# Patient Record
Sex: FEMALE | Race: AMERICAN INDIAN OR ALASKA NATIVE
[De-identification: names, ages, dates, MRNs, and addresses within clinical notes are randomized per-mention and may not be internally consistent; named-entity substitution may affect disease eponyms.]

---

## 2019-06-19 ENCOUNTER — HOSPITAL ENCOUNTER (EMERGENCY)
Dept: HOSPITAL 5 - ED | Age: 19
Discharge: HOME | End: 2019-06-19
Payer: SELF-PAY

## 2019-06-19 DIAGNOSIS — N76.0: Primary | ICD-10-CM

## 2019-06-19 DIAGNOSIS — B96.89: ICD-10-CM

## 2019-06-19 DIAGNOSIS — Z76.0: ICD-10-CM

## 2019-06-19 PROCEDURE — 99281 EMR DPT VST MAYX REQ PHY/QHP: CPT

## 2019-06-19 NOTE — EMERGENCY DEPARTMENT REPORT
ED Recheck HPI





- General


Chief Complaint: Urogenital-Female


Stated Complaint: BV PROMLEMS


Time Seen by Provider: 06/19/19 10:58


Source: patient


Mode of arrival: Ambulatory


Limitations: No Limitations





- History of Present Illness


Initial Comments: 





Patient is a 19-year-old female who comes to the ER for a refill of her Flagyl. 

She states that she gets often.  She is not concerned about STDs.  Her last 

menstrual cycle was last week.  She states that she just cannot get in with her 

OB/GYN.  Denies fever or abdominal pain.  She states that she has no nausea 

vomiting diarrhea.  She has no abdominal pain or back pain.


MD Complaint: medication refill request, other





- Related Data


                                  Previous Rx's











 Medication  Instructions  Recorded  Last Taken  Type


 


metroNIDAZOLE [Flagyl] 500 mg PO Q12HR #20 tab 06/19/19 Unknown Rx














ED Review of Systems


ROS: 


Stated complaint: BV PROMLEMS


Other details as noted in HPI





Comment: All other systems reviewed and negative





ED Past Medical Hx





- Past Medical History


Previous Medical History?: No





- Surgical History


Past Surgical History?: No





- Family History


Family history: no significant





- Medications


Home Medications: 


                                Home Medications











 Medication  Instructions  Recorded  Confirmed  Last Taken  Type


 


metroNIDAZOLE [Flagyl] 500 mg PO Q12HR #20 tab 06/19/19  Unknown Rx














ED Physical Exam





- General


Limitations: No Limitations





- Other


Other exam information: 





WDWN patient in NAD


VS per RN flow sheet


Alert and oriented to person, place and time. 


S1-S2.  No S3 or S4.  No systolic or diastolic murmur.  No JVD.  No pitting 

edema.


Lungs clear to auscultation bilaterally anteriorly and posteriorly.


Abdomen soft nontender bowel sounds x4


Moves all extremities well.


Mood and affect appropriate. 








ED Recheck MDM





- Differential Diagnosis


Prescription Refill(s)





- Medical Decision Making





SEE HPI





VSS


NAD 


NO FEVER


NO ABD PAIN 


LMP LAST WEEK


AMBULATORY


TAKING PO


NON TOXIC








Critical care attestation.: 


If time is entered above; I have spent that time in minutes in the direct care 

of this critically ill patient, excluding procedure time.








ED Disposition


Clinical Impression: 


 Medication refill, Bacterial vaginosis





Disposition: DC-01 TO HOME OR SELFCARE


Is pt being admited?: No


Does the pt Need Aspirin: No


Condition: Stable


Instructions:  Bacterial Vaginosis (ED)


Additional Instructions: 


DIET AS TOLERATED





MEDS AS ORDERED TODAY IN ER


FOLLOW INSTRUCTIONS ON THE BOTTLE





FOLLOW UP PCP WITHIN 48 HOURS TO ENSURE YOU ARE GETTING BETTER





ACTIVITY AS TOLERATED





MOTRIN OR TYLENOL FOR PAIN OR FEVER





RETURN TO THE ER FOR WORSENING SYMPTOMS NOT RELIEVED BY YOUR MEDICATIONS.











Prescriptions: 


metroNIDAZOLE [Flagyl] 500 mg PO Q12HR #20 tab


Referrals: 


ANTHONY CRISOSTOMO MD [Staff Physician] - 3-5 Days


Time of Disposition: 11:00

## 2019-07-09 ENCOUNTER — HOSPITAL ENCOUNTER (EMERGENCY)
Dept: HOSPITAL 5 - ED | Age: 19
LOS: 1 days | Discharge: HOME | End: 2019-07-10
Payer: COMMERCIAL

## 2019-07-09 DIAGNOSIS — B37.3: ICD-10-CM

## 2019-07-09 DIAGNOSIS — B96.89: ICD-10-CM

## 2019-07-09 DIAGNOSIS — N76.0: Primary | ICD-10-CM

## 2019-07-09 DIAGNOSIS — A64: ICD-10-CM

## 2019-07-09 PROCEDURE — 81025 URINE PREGNANCY TEST: CPT

## 2019-07-09 PROCEDURE — 81001 URINALYSIS AUTO W/SCOPE: CPT

## 2019-07-09 PROCEDURE — 87591 N.GONORRHOEAE DNA AMP PROB: CPT

## 2019-07-09 PROCEDURE — 99284 EMERGENCY DEPT VISIT MOD MDM: CPT

## 2019-07-09 PROCEDURE — 87210 SMEAR WET MOUNT SALINE/INK: CPT

## 2019-07-09 PROCEDURE — 96372 THER/PROPH/DIAG INJ SC/IM: CPT

## 2019-07-09 NOTE — EVENT NOTE
ED Screening Note


Date of service: 07/09/19


Time: 22:04


ED Screening Note: 





19 y o female presents to ED cc of vaginal irritaion, discharge and swelling x 1

week


states she also had recent unprotected intrercourse some days ago which made sx 

worse.





This initial assessment/diagnostic orders/clinical plan/treatment(s) is/are 

subject to change based on patients health status, clinical progression and re-

assessment by fellow clinical providers in the ED. Further treatment and workup 

at subsequent clinical providers discretion. Patient/guardian urged not to elope

from the ED as their condition may be serious if not clinically assessed and 

managed. 





Initial orders include: 


ua,upt, wet prep


g/c

## 2019-07-10 VITALS — DIASTOLIC BLOOD PRESSURE: 69 MMHG | SYSTOLIC BLOOD PRESSURE: 111 MMHG

## 2019-07-10 LAB
BILIRUB UR QL STRIP: (no result)
BLOOD UR QL VISUAL: (no result)
MUCOUS THREADS #/AREA URNS HPF: (no result) /HPF
PH UR STRIP: 6 [PH] (ref 5–7)
RBC #/AREA URNS HPF: 13 /HPF (ref 0–6)
UROBILINOGEN UR-MCNC: < 2 MG/DL (ref ?–2)
WBC #/AREA URNS HPF: 8 /HPF (ref 0–6)

## 2019-07-10 NOTE — EMERGENCY DEPARTMENT REPORT
ED Female  HPI





- General


Chief complaint: Urogenital-Female


Stated complaint: IRRITATED VAGINAL


Time Seen by Provider: 19 22:03


Source: patient


Mode of arrival: Ambulatory


Limitations: No Limitations





- History of Present Illness


Initial comments: 





Patient is a nulliparous 19-year-old -American female with no past 

medical history presents to the ED with complaint of acute onset persistent 

vaginal irritation and pain with dyspareunia and yellow vaginal discharge with 

fishy smell for the last 4 days after having unprotected sexual intercourse.  

Patient denies dysuria, urinary frequency and urgency, vaginal bleeding, nausea,

vomiting, pelvic pain, fever, chills, diarrhea, no back pain or hematuria.


MD Complaint: vaginal discharge, other (vaginal irritation and pain)


-: Sudden, days(s) (4)


Location: other (vagina)


Radiation: non-radiating


Severity: moderate


Severity scale (0 -10): 6


Quality: sharp, burning, aching


Consistency: constant


Improves with: none


Worsens with: intercourse, movement


Are you Pregnant Now?: No


Last Menstrual Period: 19


EDC: 20


Associated Symptoms: denies other symptoms, vaginal discharge.  denies: vaginal 

bleeding, abdominal pain, nausea/vomiting, fever/chills, headaches, loss of 

appetite, dysuria, hematuria, rash, seizure, shortness of breath, syncope





- Related Data


Sexually active: Yes


: 0


Para: 0


A: 0


                                  Previous Rx's











 Medication  Instructions  Recorded  Last Taken  Type


 


Fluconazole [Diflucan TAB] 150 mg PO ONCE #2 tablet 07/10/19 Unknown Rx


 


metroNIDAZOLE [Flagyl TAB] 500 mg PO Q12HR #20 tab 07/10/19 Unknown Rx











                                    Allergies











Allergy/AdvReac Type Severity Reaction Status Date / Time


 


No Known Allergies Allergy   Verified 19 21:01














ED Review of Systems


ROS: 


Stated complaint: IRRITATED VAGINAL


Other details as noted in HPI





Constitutional: denies: chills, fever


Eyes: denies: eye pain, eye discharge, vision change


ENT: denies: ear pain, throat pain


Respiratory: denies: cough, shortness of breath, wheezing


Cardiovascular: denies: chest pain, palpitations


Endocrine: no symptoms reported


Gastrointestinal: denies: abdominal pain, nausea, diarrhea


Genitourinary: discharge, dyspareunia, other (vaginal irritation).  denies: 

urgency, dysuria


Musculoskeletal: denies: back pain, joint swelling, arthralgia


Skin: denies: rash, lesions


Neurological: denies: headache, weakness, paresthesias


Psychiatric: denies: anxiety, depression


Hematological/Lymphatic: denies: easy bleeding, easy bruising





ED Past Medical Hx





- Past Medical History


Previous Medical History?: No





- Surgical History


Past Surgical History?: No





- Social History


Smoking Status: Never Smoker


Substance Use Type: None





- Medications


Home Medications: 


                                Home Medications











 Medication  Instructions  Recorded  Confirmed  Last Taken  Type


 


Fluconazole [Diflucan TAB] 150 mg PO ONCE #2 tablet 07/10/19  Unknown Rx


 


metroNIDAZOLE [Flagyl TAB] 500 mg PO Q12HR #20 tab 07/10/19  Unknown Rx














ED Physical Exam





- General


Limitations: No Limitations


General appearance: alert, in no apparent distress





- Head


Head exam: Present: atraumatic, normocephalic, normal inspection





- Eye


Eye exam: Present: normal appearance, PERRL, EOMI


Pupils: Present: normal accommodation





- ENT


ENT exam: Present: normal exam, normal orophraynx, mucous membranes moist, TM's 

normal bilaterally, normal external ear exam





- Neck


Neck exam: Present: normal inspection





- Respiratory


Respiratory exam: Present: normal lung sounds bilaterally.  Absent: respiratory 

distress, wheezes, rales, rhonchi, chest wall tenderness, accessory muscle use, 

decreased breath sounds, prolonged expiratory





- Cardiovascular


Cardiovascular Exam: Present: regular rate, normal rhythm, normal heart sounds. 

Absent: systolic murmur, diastolic murmur, rubs, gallop





- GI/Abdominal


GI/Abdominal exam: Present: soft, normal bowel sounds.  Absent: tenderness, 

guarding, rebound, hyperactive bowel sounds, hypoactive bowel sounds, 

organomegaly





- Rectal


Rectal exam: Present: deferred





- 


Speculum exam: Present: vaginal discharge, cervical discharge.  Absent: vaginal 

bleeding, foreign body


Bi-manual exam: Present: normal bi-manual exam, other (Female RN present during 

pelvic exam).  Absent: cervical motion tendernes, adnexal tenderness, uterine 

enlargement, uterine tenderness





- Extremities Exam


Extremities exam: Present: normal inspection, full ROM, normal capillary refill





- Back Exam


Back exam: Present: normal inspection, full ROM.  Absent: CVA tenderness (L), 

muscle spasm, paraspinal tenderness





- Neurological Exam


Neurological exam: Present: alert, oriented X3, CN II-XII intact, normal gait, 

reflexes normal





- Psychiatric


Psychiatric exam: Present: normal affect, normal mood





- Skin


Skin exam: Present: warm, dry, intact, normal color.  Absent: rash





ED Course





                                   Vital Signs











  19





  22:07


 


Temperature 98.5 F


 


Pulse Rate 70


 


Respiratory 18





Rate 


 


Blood Pressure 119/71


 


O2 Sat by Pulse 99





Oximetry 














- Reevaluation(s)


Reevaluation #1: 





07/10/19 01:35


Patient is alert and oriented 3 and is not in distress.  Urinalysis is 

unremarkable.  Exam is remarkable for thick yellowish vaginal discharge with 

malodorous fishy smell.  The cervix is irritated.  No cervical motion tenderness

or adnexal tenderness on pelvic exam.  Patient empirically treated in the ED for

STD given the exposure, and he sent home on medications for bacterial vaginosis 

and candidal vaginitis.  Patient advised to follow-up with her primary care 

physician or OB/GYN physician in 7-10 days for reevaluation.  Patient advised to

return to the ED immediately if symptoms get worse.





ED Medical Decision Making





- Medical Decision Making





Patient is alert and oriented 3 and is not in distress.  Urinalysis is 

unremarkable.  Exam is remarkable for thick yellowish vaginal discharge with 

malodorous fishy smell.  The cervix is irritated.  No cervical motion tenderness

or adnexal tenderness on pelvic exam.  Patient empirically treated in the ED for

STD given the exposure, and he sent home on medications for bacterial vaginosis 

and candidal vaginitis.  Patient advised to follow-up with her primary care 

physician or OB/GYN physician in 7-10 days for reevaluation.  Patient advised to

return to the ED immediately if symptoms get worse.





- Differential Diagnosis


Bacterial vaginosis; Candida vaginitis; Trichomonas vaginitis, STD


Critical care attestation.: 


If time is entered above; I have spent that time in minutes in the direct care 

of this critically ill patient, excluding procedure time.








ED Disposition


Clinical Impression: 


 Vaginitis due to Candida, Bacterial vaginosis, STD (sexually transmitted 

disease)





Disposition: - TO HOME OR SELFCARE


Is pt being admited?: No


Does the pt Need Aspirin: No


Condition: Stable


Instructions:  Bacterial Vaginosis (ED), Vaginitis (ED)


Additional Instructions: 


Take medications and food, drink plenty of fluids and follow-up with your 

primary-care physician in 7-10 days for reevaluation.  Return to the ED 

immediately if symptoms get worse.


Prescriptions: 


Fluconazole [Diflucan TAB] 150 mg PO ONCE #2 tablet


metroNIDAZOLE [Flagyl TAB] 500 mg PO Q12HR #20 tab


Referrals: 


[facility] [Outside] - 3-5 Days


Forms:  STI Treatment and Prevention


Time of Disposition: 01:24


Print Language: ENGLISH

## 2019-12-13 ENCOUNTER — HOSPITAL ENCOUNTER (EMERGENCY)
Dept: HOSPITAL 5 - ED | Age: 19
Discharge: LEFT BEFORE DISCHARGE | End: 2019-12-13
Payer: SELF-PAY

## 2019-12-13 VITALS — DIASTOLIC BLOOD PRESSURE: 84 MMHG | SYSTOLIC BLOOD PRESSURE: 123 MMHG

## 2019-12-13 DIAGNOSIS — N89.8: Primary | ICD-10-CM

## 2019-12-13 NOTE — EMERGENCY DEPARTMENT REPORT
Chief Complaint: Urogenital-Female


Stated Complaint: BV


Time Seen by Provider: 12/13/19 10:29





- HPI


History of Present Illness: 





19-year-old female presents to the ED stay and she thinks she has a bacterial 

infection as she has been having vaginal discharge and has had these symptoms 

before in the past.  Patient denies fevers/chills/nausea vomiting/pelvic 

pain/dysuria or any problems.





- ROS


Review of Systems: 





Is noted in HPI





- Exam


Vital Signs: 


                                   Vital Signs











  12/13/19





  09:57


 


Temperature 98.2 F


 


Pulse Rate 69


 


Respiratory 20





Rate 


 


Blood Pressure 123/84


 


O2 Sat by Pulse 100





Oximetry 











Physical Exam: 





Neuro: Alert and oriented 3, in no acute distress


MSE screening note: 


Focused history and physical exam performed.


Due to findings the following was ordered:











ED Medical Decision Making





- Medical Decision Making





I discussed with Ms Hester at this is not a medical emergency and she isn't 

should follow-up with outside Medical Center for screening and treatment.


Vital signs are normal patient is in no acute distress.








ED Disposition for MSE


Clinical Impression: 


 Vaginal discharge





Disposition: Z-07 MED SCREENING EXAM-LEFT


Is pt being admited?: No


Does the pt Need Aspirin: No


Condition: Stable


Instructions:  Vaginitis (ED)


Additional Instructions: 


Make sure to follow up with the primary care physician as discussed.


Take all your medications as you've been prescribed.


If you have any worsening symptoms or develop new symptoms please return to ED 

immediately.


Prescriptions: 


metroNIDAZOLE [Flagyl TAB] 500 mg PO Q12HR #14 tab


Referrals: 


[facility] [Outside] - 3-5 Days


[facility] [Outside] - 3-5 Days


[facility] [Outside] - 3-5 Days


Forms:  Work/School Release Form(ED)


Time of Disposition: 10:48

## 2020-03-29 ENCOUNTER — HOSPITAL ENCOUNTER (EMERGENCY)
Dept: HOSPITAL 5 - ED | Age: 20
Discharge: HOME | End: 2020-03-29
Payer: SELF-PAY

## 2020-03-29 VITALS — DIASTOLIC BLOOD PRESSURE: 92 MMHG | SYSTOLIC BLOOD PRESSURE: 146 MMHG

## 2020-03-29 DIAGNOSIS — N89.8: ICD-10-CM

## 2020-03-29 DIAGNOSIS — Z79.899: ICD-10-CM

## 2020-03-29 DIAGNOSIS — J06.9: Primary | ICD-10-CM

## 2020-03-29 PROCEDURE — 71046 X-RAY EXAM CHEST 2 VIEWS: CPT

## 2020-03-29 PROCEDURE — 99283 EMERGENCY DEPT VISIT LOW MDM: CPT

## 2020-03-29 NOTE — XRAY REPORT
CHEST 2 VIEWS 



INDICATION / CLINICAL INFORMATION:

cough, fever 1 month.



COMPARISON: 

None available.



FINDINGS:



SUPPORT DEVICES: None.



HEART / MEDIASTINUM: No significant abnormality. 



LUNGS / PLEURA: No significant pulmonary or pleural abnormality. No pneumothorax. 



ADDITIONAL FINDINGS: No significant additional findings.



IMPRESSION:

1. No acute findings.



Signer Name: Anna Zurita MD 

Signed: 3/29/2020 9:20 PM

Workstation Name: [id number]

## 2020-03-29 NOTE — EMERGENCY DEPARTMENT REPORT
HPI





- General


Chief Complaint: Upper Respiratory Infection


Time Seen by Provider: 20 20:34





- HPI


HPI: 





This is a 19-year-old female here reports that she has cough x2 weeks.  She 

denies any fever or chills.  Denies any nausea vomiting or diarrhea.  Denies any

abdominal or chest pain.  Denies any neck pain or stiffness denies any sore 

throat or headache.  She reports that she is having nasal congestion and runny 

nose.  Patient is also concerned about STD exposure last menstrual period was 

3/10/2019.  She denies any urinary burning frequency urgency.  She says she is 

having some vaginal discharge that recently started.  She says she does not have

a primary care physician.  Pain is 0/10





ED Past Medical Hx





- Past Medical History


Previous Medical History?: No


Additional medical history: Lupus





- Surgical History


Past Surgical History?: No





- Family History


Family history: hypertension





- Social History


Smoking Status: Never Smoker


Substance Use Type: None





- Medications


Home Medications: 


                                Home Medications











 Medication  Instructions  Recorded  Confirmed  Last Taken  Type


 


Fluconazole [Diflucan TAB] 150 mg PO ONCE #2 tablet 07/10/19  Unknown Rx


 


metroNIDAZOLE [Flagyl TAB] 500 mg PO Q12HR #14 tab 19  Unknown Rx


 


Cetirizine HCl [ZyrTEC] 10 mg PO QAM 14 Days #14 capsule 20  Unknown Rx


 


Fluticasone [Flonase] 1 spray NS QDAY 14 Days #1 bottle 20  Unknown Rx














ED Review of Systems


ROS: 


Stated complaint: COUGH POSSIBLE STD


Other details as noted in HPI





Constitutional: denies: chills, fever


ENT: congestion.  denies: ear pain, throat pain


Respiratory: cough.  denies: shortness of breath, SOB with exertion, SOB at 

rest, stridor, wheezing


Cardiovascular: denies: chest pain, palpitations, dyspnea on exertion, edema, 

syncope


Gastrointestinal: denies: abdominal pain, nausea, vomiting


Genitourinary: discharge.  denies: urgency, dysuria, frequency, hematuria, 

abnormal menses, dyspareunia


Musculoskeletal: denies: back pain, arthralgia, myalgia


Skin: denies: rash


Neurological: denies: headache, vertigo





Physical Exam





- Physical Exam


Vital Signs: 


                                   Vital Signs











  20





  20:14


 


Temperature 97.8 F


 


Pulse Rate 79


 


Respiratory 16





Rate 


 


Blood Pressure 146/92


 


O2 Sat by Pulse 100





Oximetry 











General: 





This is a 19-year-old female well-nourished well-developed in no acute distress.

  Patient is nontoxic


Physical Exam: 





Head: Normocephalic atraumatic


Ears:BIateral middle ear congested without erythema and loss of bony landmarks. 

 Maxwell EAC with normal exam.  No mastoid bone tenderness.


Nose: Nasal turbinates pale and boggy.  Clear drainage.  Maxillary and frontal 

sinuses nontender to palpate


Mouth: Moist, no pharyngeal erythema or exudate .  e.  UVULA midline and oral 

airways patent. No peritonsillar abscess


Neck: Nontender to palpate, supple, normal range of motion. No adenopathy. No c-

spine tenderness.  


 Eyes: Bilateral Sclerae and  conjunctiva without injection.  Bilateral pupils 

equal and reactive to light.  Bilateral lids are normal.    Normal 

accommodation.BEOMI


Lungs: Clear to auscultate bilaterally, no rhonchi wheezes or rales.  Normal 

work of breathing and no chest wall tenderness


CV: S1, S2.  Regular rate and rhythm negative murmur.  Capillary refill is less 

than 3 seconds


Abdomen: Nontender to palpation in all quadrants: No guarding or rebound 

tenderness.  Positive bowel sounds in all quadrants


Extremity: No clubbing, cyanosis or edema.  +2 pulses in all extremities and no 

neurovascular compromise


Skin: Clean dry and intact, no rashes or lesions


Psych: Normal mood and behavior





ED Course


                                   Vital Signs











  20





  20:14


 


Temperature 97.8 F


 


Pulse Rate 79


 


Respiratory 16





Rate 


 


Blood Pressure 146/92


 


O2 Sat by Pulse 100





Oximetry 














- Reevaluation(s)


Reevaluation #1: 





20 21:46


Patient remained stable throughout ED course.





ED Medical Decision Making





- Radiology Data


Radiology results: report reviewed


Chest x-ray PA and lateral dictated by radiologist and report reviewed by 

myself.  Please see details below


Findings





[facility] 


[address] [place] [address] 





XRay Report 


Signed 





 Patient: NORTH CHACON MR#: [id number] 


 [id number] 


 : 2000 Acct:[id number] 





 Age/Sex: 19 / F ADM Date: 20 





 Loc: ED 


 Attending Dr: 








 Ordering Physician: JARAD PEREYRA 


 Date of Service: 20 


 Procedure(s): XR chest routine 2V 


 Accession Number(s): [id number] 





 cc: JARAD PEREYRA Time In Minutes: 





 CHEST 2 VIEWS 





INDICATION / CLINICAL INFORMATION: 


cough, fever 1 month. 





COMPARISON: 


None available. 





FINDINGS: 





SUPPORT DEVICES: None. 





HEART / MEDIASTINUM: No significant abnormality. 





LUNGS / PLEURA: No significant pulmonary or pleural abnormality. No 

pneumothorax. 





ADDITIONAL FINDINGS: No significant additional findings. 





IMPRESSION: 


1. No acute findings. 





Signer Name: Anna Zurita MD 


Signed: 3/29/2020 9:20 PM 


Workstation Name: [id number] 








 Transcribed By: HRC 


 Dictated By: Anna Zurita MD 


 Electronically Authenticated By: Anna Zurita MD 


 Signed Date/Time: 20 











 DD/DT: 20 


 TD/TT: 





- Medical Decision Making





This is a 19-year-old female here report that she has coughing x2 weeks with 

nasal drainage and congestion.  Patient physical findings for upper respiratory 

otherwise stable.  Chest x-ray dictated by radiologist and report reviewed 

myself and no acute findings.  Patient with URI with cough and congestion 

without any fever.  Vital signs are stable she is afebrile.  Patient discharged 

home in stable condition with prescription for Zyrtec, Tessalon Perles and 

Flonase and to follow-up with primary care physician in 2 to 3 days.  She voiced

 understanding of discharge diagnosis, x-ray report and discharged home in 

stable condition.





- Differential Diagnosis


PNA, bronchitis, sinusitis, rhinitis, URI with cough and congestion


Critical care attestation.: 


If time is entered above; I have spent that time in minutes in the direct care o

f this critically ill patient, excluding procedure time.








ED Disposition


Clinical Impression: 


 URI with cough and congestion





Disposition: DC-01 TO HOME OR SELFCARE


Is pt being admited?: No


Does the pt Need Aspirin: No


Condition: Stable


Instructions:  Upper Respiratory Infection (ED), Acute Cough (ED)


Additional Instructions: 


Please follow-up with primary care physician in 2 to 3 days as discussed.  Ryan dove refer to discharge instruction for clinic name and address with phone number


Take medication as prescribed.


If your condition worsen return to the emergency room otherwise follow-up with 

primary care


Please follow-up with [facility] regarding concerns for 

STD.  You can also follow-up at [facility] clinic as instructed 

for management of this.


Referrals: 


Follow-up, [facility] [Other] - 2-3 Days


[facility] [Outside] - 2-3 Days


Forms:  Work/School Release Form(ED)

## 2021-02-11 ENCOUNTER — HOSPITAL ENCOUNTER (INPATIENT)
Dept: HOSPITAL 5 - ED | Age: 21
LOS: 8 days | Discharge: HOME | DRG: 674 | End: 2021-02-19
Attending: INTERNAL MEDICINE | Admitting: HOSPITALIST
Payer: COMMERCIAL

## 2021-02-11 DIAGNOSIS — I12.0: ICD-10-CM

## 2021-02-11 DIAGNOSIS — R30.0: ICD-10-CM

## 2021-02-11 DIAGNOSIS — Z20.822: ICD-10-CM

## 2021-02-11 DIAGNOSIS — R65.10: ICD-10-CM

## 2021-02-11 DIAGNOSIS — Z79.899: ICD-10-CM

## 2021-02-11 DIAGNOSIS — N18.6: ICD-10-CM

## 2021-02-11 DIAGNOSIS — N17.9: Primary | ICD-10-CM

## 2021-02-11 DIAGNOSIS — M32.14: ICD-10-CM

## 2021-02-11 DIAGNOSIS — D72.829: ICD-10-CM

## 2021-02-11 LAB
BASOPHILS # (AUTO): 0.1 K/MM3 (ref 0–0.1)
BASOPHILS NFR BLD AUTO: 0.4 % (ref 0–1.8)
BILIRUB UR QL STRIP: (no result)
BLOOD UR QL VISUAL: (no result)
BUN SERPL-MCNC: 57 MG/DL (ref 7–17)
BUN SERPL-MCNC: 58 MG/DL (ref 7–17)
BUN/CREAT SERPL: 9 %
BUN/CREAT SERPL: 9 %
CALCIUM SERPL-MCNC: 7.7 MG/DL (ref 8.4–10.2)
CALCIUM SERPL-MCNC: 7.9 MG/DL (ref 8.4–10.2)
EOSINOPHIL # BLD AUTO: 0.2 K/MM3 (ref 0–0.4)
EOSINOPHIL NFR BLD AUTO: 1.9 % (ref 0–4.3)
HCT VFR BLD CALC: 30.4 % (ref 30.3–42.9)
HEMOLYSIS INDEX: 22
HEMOLYSIS INDEX: 23
HGB BLD-MCNC: 9.9 GM/DL (ref 10.1–14.3)
HYALINE CASTS #/AREA URNS LPF: 1 /LPF
LYMPHOCYTES # BLD AUTO: 1.1 K/MM3 (ref 1.2–5.4)
LYMPHOCYTES NFR BLD AUTO: 8.5 % (ref 13.4–35)
MCHC RBC AUTO-ENTMCNC: 32 % (ref 30–34)
MCV RBC AUTO: 82 FL (ref 79–97)
MONOCYTES # (AUTO): 1.2 K/MM3 (ref 0–0.8)
MONOCYTES % (AUTO): 9 % (ref 0–7.3)
PH UR STRIP: 7 [PH] (ref 5–7)
PLATELET # BLD: 120 K/MM3 (ref 140–440)
PROT UR STRIP-MCNC: >500 MG/DL
RBC # BLD AUTO: 3.69 M/MM3 (ref 3.65–5.03)
RBC #/AREA URNS HPF: 13 /HPF (ref 0–6)
UROBILINOGEN UR-MCNC: < 2 MG/DL (ref ?–2)
WBC #/AREA URNS HPF: 4 /HPF (ref 0–6)

## 2021-02-11 PROCEDURE — 86038 ANTINUCLEAR ANTIBODIES: CPT

## 2021-02-11 PROCEDURE — 77001 FLUOROGUIDE FOR VEIN DEVICE: CPT

## 2021-02-11 PROCEDURE — 82728 ASSAY OF FERRITIN: CPT

## 2021-02-11 PROCEDURE — 81001 URINALYSIS AUTO W/SCOPE: CPT

## 2021-02-11 PROCEDURE — 89050 BODY FLUID CELL COUNT: CPT

## 2021-02-11 PROCEDURE — 85610 PROTHROMBIN TIME: CPT

## 2021-02-11 PROCEDURE — 84300 ASSAY OF URINE SODIUM: CPT

## 2021-02-11 PROCEDURE — 87040 BLOOD CULTURE FOR BACTERIA: CPT

## 2021-02-11 PROCEDURE — 84156 ASSAY OF PROTEIN URINE: CPT

## 2021-02-11 PROCEDURE — 85025 COMPLETE CBC W/AUTO DIFF WBC: CPT

## 2021-02-11 PROCEDURE — 86706 HEP B SURFACE ANTIBODY: CPT

## 2021-02-11 PROCEDURE — 80048 BASIC METABOLIC PNL TOTAL CA: CPT

## 2021-02-11 PROCEDURE — 86021 WBC ANTIBODY IDENTIFICATION: CPT

## 2021-02-11 PROCEDURE — 96374 THER/PROPH/DIAG INJ IV PUSH: CPT

## 2021-02-11 PROCEDURE — 36558 INSERT TUNNELED CV CATH: CPT

## 2021-02-11 PROCEDURE — 81025 URINE PREGNANCY TEST: CPT

## 2021-02-11 PROCEDURE — 83970 ASSAY OF PARATHORMONE: CPT

## 2021-02-11 PROCEDURE — 76770 US EXAM ABDO BACK WALL COMP: CPT

## 2021-02-11 PROCEDURE — 87806 HIV AG W/HIV1&2 ANTB W/OPTIC: CPT

## 2021-02-11 PROCEDURE — 71046 X-RAY EXAM CHEST 2 VIEWS: CPT

## 2021-02-11 PROCEDURE — 96365 THER/PROPH/DIAG IV INF INIT: CPT

## 2021-02-11 PROCEDURE — 84550 ASSAY OF BLOOD/URIC ACID: CPT

## 2021-02-11 PROCEDURE — 93005 ELECTROCARDIOGRAM TRACING: CPT

## 2021-02-11 PROCEDURE — 84100 ASSAY OF PHOSPHORUS: CPT

## 2021-02-11 PROCEDURE — 74176 CT ABD & PELVIS W/O CONTRAST: CPT

## 2021-02-11 PROCEDURE — 77012 CT SCAN FOR NEEDLE BIOPSY: CPT

## 2021-02-11 PROCEDURE — 83520 IMMUNOASSAY QUANT NOS NONAB: CPT

## 2021-02-11 PROCEDURE — 82570 ASSAY OF URINE CREATININE: CPT

## 2021-02-11 PROCEDURE — 87086 URINE CULTURE/COLONY COUNT: CPT

## 2021-02-11 PROCEDURE — 85007 BL SMEAR W/DIFF WBC COUNT: CPT

## 2021-02-11 PROCEDURE — C1750 CATH, HEMODIALYSIS,LONG-TERM: HCPCS

## 2021-02-11 PROCEDURE — 86160 COMPLEMENT ANTIGEN: CPT

## 2021-02-11 PROCEDURE — 82550 ASSAY OF CK (CPK): CPT

## 2021-02-11 PROCEDURE — 36415 COLL VENOUS BLD VENIPUNCTURE: CPT

## 2021-02-11 PROCEDURE — 83690 ASSAY OF LIPASE: CPT

## 2021-02-11 PROCEDURE — 76937 US GUIDE VASCULAR ACCESS: CPT

## 2021-02-11 PROCEDURE — U0003 INFECTIOUS AGENT DETECTION BY NUCLEIC ACID (DNA OR RNA); SEVERE ACUTE RESPIRATORY SYNDROME CORONAVIRUS 2 (SARS-COV-2) (CORONAVIRUS DISEASE [COVID-19]), AMPLIFIED PROBE TECHNIQUE, MAKING USE OF HIGH THROUGHPUT TECHNOLOGIES AS DESCRIBED BY CMS-2020-01-R: HCPCS

## 2021-02-11 PROCEDURE — 83550 IRON BINDING TEST: CPT

## 2021-02-11 PROCEDURE — 83615 LACTATE (LD) (LDH) ENZYME: CPT

## 2021-02-11 PROCEDURE — 96375 TX/PRO/DX INJ NEW DRUG ADDON: CPT

## 2021-02-11 PROCEDURE — 86803 HEPATITIS C AB TEST: CPT

## 2021-02-11 PROCEDURE — 84484 ASSAY OF TROPONIN QUANT: CPT

## 2021-02-11 PROCEDURE — 93306 TTE W/DOPPLER COMPLETE: CPT

## 2021-02-11 PROCEDURE — 85730 THROMBOPLASTIN TIME PARTIAL: CPT

## 2021-02-11 NOTE — EMERGENCY DEPARTMENT REPORT
ED General Adult HPI





- General


Stated complaint: CHEST/BACK/HEAD PAIN/BURNING URINATION





- History of Present Illness


Initial comments: 


Patient 20-year-old -American female who presents for cough chest wall 

pain with cough bilateral flank pain And generalized malaise for 1 week.  

Patient states subjective fever no fever noted in triage tonight.  Patient 

denies suspicious contacts travel or known Covid contacts.  Patient states 

intermittent nausea.  There is no wheezing, stridor, shortness of breath.  

Symptoms are exacerbated by activity.  Symptoms are relieved by nothing tried.  

Last menstrual cycle 1 month ago.  V





- Related Data


                                  Previous Rx's











 Medication  Instructions  Recorded  Last Taken  Type


 


Fluconazole (Nf) [Diflucan TAB] 150 mg PO ONCE #2 tablet 07/10/19 Unknown Rx


 


metroNIDAZOLE [Flagyl TAB] 500 mg PO Q12HR #14 tab 12/13/19 Unknown Rx


 


Cetirizine HCl [ZyrTEC] 10 mg PO QAM 14 Days #14 capsule 03/29/20 Unknown Rx


 


Fluticasone [Flonase] 1 spray NS QDAY 14 Days #1 bottle 03/29/20 Unknown Rx











                                    Allergies











Allergy/AdvReac Type Severity Reaction Status Date / Time


 


No Known Allergies Allergy   Verified 03/29/20 20:14














ED Review of Systems


ROS: 


Stated complaint: CHEST/BACK/HEAD PAIN/BURNING URINATION


Other details as noted in HPI





Constitutional: malaise.  denies: chills, fever


Eyes: denies: eye pain, eye discharge, vision change


ENT: congestion


Respiratory: cough


Cardiovascular: chest pain


Endocrine: no symptoms reported


Gastrointestinal: denies: abdominal pain, nausea, diarrhea


Genitourinary: denies: urgency, dysuria, discharge


Musculoskeletal: back pain.  denies: joint swelling, arthralgia


Skin: denies: rash, lesions


Neurological: denies: headache, weakness, paresthesias, vertigo


Psychiatric: denies: anxiety, depression


Hematological/Lymphatic: denies: easy bleeding, easy bruising





ED Past Medical Hx





- Past Medical History


Additional medical history: Lupus





- Social History


Smoking Status: Never Smoker


Substance Use Type: None





- Medications


Home Medications: 


                                Home Medications











 Medication  Instructions  Recorded  Confirmed  Last Taken  Type


 


Fluconazole (Nf) [Diflucan TAB] 150 mg PO ONCE #2 tablet 07/10/19  Unknown Rx


 


metroNIDAZOLE [Flagyl TAB] 500 mg PO Q12HR #14 tab 12/13/19  Unknown Rx


 


Cetirizine HCl [ZyrTEC] 10 mg PO QAM 14 Days #14 capsule 03/29/20  Unknown Rx


 


Fluticasone [Flonase] 1 spray NS QDAY 14 Days #1 bottle 03/29/20  Unknown Rx














ED Physical Exam





- General


General appearance: alert, in no apparent distress





- Head


Head exam: Present: atraumatic, normocephalic





- Eye


Eye exam: Present: normal appearance, EOMI


Pupils: Present: normal accommodation





- ENT


ENT exam: Present: mucous membranes moist





- Neck


Neck exam: Present: normal inspection





- Respiratory


Respiratory exam: Present: normal lung sounds bilaterally, chest wall tenderness

 (bilat anterior chest wall pain to palpation ).  Absent: respiratory distress, 

wheezes, stridor





- Cardiovascular


Cardiovascular Exam: Present: regular rate, normal rhythm, normal heart sounds. 

 Absent: systolic murmur, diastolic murmur, rubs, gallop





- GI/Abdominal


GI/Abdominal exam: Present: soft, normal bowel sounds.  Absent: distended, 

tenderness, guarding, rebound, rigid, bruit, hernia





- Rectal


Rectal exam: Present: deferred





- Extremities Exam


Extremities exam: Present: normal inspection, full ROM.  Absent: tenderness





- Back Exam


Back exam: Present: full ROM, tenderness, CVA tenderness (R), CVA tenderness (L)





- Neurological Exam


Neurological exam: Present: alert, oriented X3, CN II-XII intact, normal gait, 

reflexes normal





- Psychiatric


Psychiatric exam: Present: normal affect, normal mood





- Skin


Skin exam: Present: warm, dry, intact, normal color.  Absent: rash


Critical care attestation.: 


If time is entered above; I have spent that time in minutes in the direct care 

of this critically ill patient, excluding procedure time.








ED Disposition


Condition: Stable

## 2021-02-11 NOTE — EMERGENCY DEPARTMENT REPORT
ED Chest Pain HPI





- General


Chief Complaint: Chest Pain


Stated Complaint: CHEST/BACK/HEAD PAIN/BURNING URINATION


PUI?: No


Time Seen by Provider: 02/11/21 21:56


Source: patient


Mode of arrival: Ambulatory


Limitations: No Limitations





- History of Present Illness


Initial Comments: 





Patient is a 20-year-old female that presents emergency room with multiple 

complaints.  Patient states that she woke up this morning at 7 AM with bilateral

chest pain, back pain, headache.  Patient states the symptoms are worsening.    







Patient states that her chest pain is worse with palpation and movement. Patient

dates the chest pain is a 10 out of 10.  Patient states that her chest pain is 

better with rest.  Patient is chest pain is in her bilateral chest.





Patient states that her back pain is a 10 out of 10.  Patient states that the 

back pain is better with rest and worse with movement.  Patient states that it 

is her lower back and flank area that she is hurting.





Patient states her headache is mild. Patient states her headache is a 3 out of 

10.  Patient states her headache is better with rest and worse with movement and

exertion.  Patient denies blurry vision.  Patient denies neck stiffness.  

Patient denies neck pain.  Patient denies fever and chills.





Patient also complains of dysuria.  Patient states that dysuria is been going on

for 2 days.  Patient states that it burns when she urinates.  Patient denies 

blood in her urine.





Patient states she is also got a vaginal discharge.  Patient states her vaginal 

discharge is white and has been going on for 2 weeks.  Patient states she was 

seen at a local family practice and was given treatment but did not have a 

pelvic exam.








Patient denies recent travel.  Patient denies recent international travel.  

Patient denies exposure to the novel coronavirus.  Patient denies sick contacts.

 Patient denies fever and chills.  Patient denies cough.  Patient denies 

diarrhea.  Patient denies coming in contact with anybody with symptoms of the 

novel coronavirus.





Nurse David in the room during the entire history and physical.








MD Complaint: chest pain


-: Sudden


Onset: during rest


Pain Location: left chest, right chest


Pain Radiation: none


Severity: severe


Severity scale (0 -10): 10


Consistency: constant


Improves With: rest


Worsens With: palpation, movement


re: nausea.  denies: vomting, diaphoresis, dyspnea, sense of impending doom


Other Symptoms: denies: cough, fever, syncope, rash, acid taste in mouth, leg 

swelling, palpitations, burping


Treatments Prior to Arrival: none


Aspirin use within the Past 7 Days: (0) No





- Related Data


On Oral Contraceptives: No


                                  Previous Rx's











 Medication  Instructions  Recorded  Last Taken  Type


 


Fluconazole (Nf) [Diflucan TAB] 150 mg PO ONCE #2 tablet 07/10/19 Unknown Rx


 


metroNIDAZOLE [Flagyl TAB] 500 mg PO Q12HR #14 tab 12/13/19 Unknown Rx


 


Cetirizine HCl [ZyrTEC] 10 mg PO QAM 14 Days #14 capsule 03/29/20 Unknown Rx


 


Fluticasone [Flonase] 1 spray NS QDAY 14 Days #1 bottle 03/29/20 Unknown Rx











                                    Allergies











Allergy/AdvReac Type Severity Reaction Status Date / Time


 


No Known Allergies Allergy   Verified 03/29/20 20:14














Heart Score





- HEART Score


History: Slightly suspicious


EKG: Normal


Age: < 45


Risk factors: No known risk factors


Troponin: < normal limit


HEART Score: 0





ED Review of Systems


ROS: 


Stated complaint: CHEST/BACK/HEAD PAIN/BURNING URINATION


Other details as noted in HPI





Constitutional: denies: chills, fever


Eyes: denies: eye pain, eye discharge, vision change


ENT: denies: ear pain, throat pain


Respiratory: denies: cough, shortness of breath, wheezing


Cardiovascular: chest pain.  denies: palpitations


Endocrine: no symptoms reported


Gastrointestinal: as per HPI, nausea.  denies: vomiting, diarrhea


Genitourinary: as per HPI, dysuria, discharge.  denies: urgency


Musculoskeletal: as per HPI, back pain.  denies: joint swelling, arthralgia


Skin: denies: rash, lesions


Neurological: as per HPI, headache.  denies: weakness, paresthesias


Psychiatric: denies: anxiety, depression


Hematological/Lymphatic: denies: easy bleeding, easy bruising





ED Past Medical Hx





- Past Medical History


Previous Medical History?: Yes


Additional medical history: Lupus





- Surgical History


Past Surgical History?: No





- Family History


Family history: no significant





- Social History


Smoking Status: Never Smoker


Substance Use Type: None





- Medications


Home Medications: 


                                Home Medications











 Medication  Instructions  Recorded  Confirmed  Last Taken  Type


 


Fluconazole (Nf) [Diflucan TAB] 150 mg PO ONCE #2 tablet 07/10/19  Unknown Rx


 


metroNIDAZOLE [Flagyl TAB] 500 mg PO Q12HR #14 tab 12/13/19  Unknown Rx


 


Cetirizine HCl [ZyrTEC] 10 mg PO QAM 14 Days #14 capsule 03/29/20  Unknown Rx


 


Fluticasone [Flonase] 1 spray NS QDAY 14 Days #1 bottle 03/29/20  Unknown Rx














ED Physical Exam





- General


Limitations: No Limitations


General appearance: alert, in no apparent distress





- Head


Head exam: Present: atraumatic, normocephalic





- Eye


Eye exam: Present: normal appearance





- ENT


ENT exam: Present: mucous membranes moist





- Neck


Neck exam: Present: normal inspection





- Respiratory


Respiratory exam: Present: normal lung sounds bilaterally, chest wall tenderness

 (Reproduces symptoms with bilateral chest wall palpation.  Tenderness to 

palpation to the bilateral chest wall.).  Absent: respiratory distress, wheezes,

 rales





- Cardiovascular


Cardiovascular Exam: Present: regular rate, normal rhythm.  Absent: systolic 

murmur, diastolic murmur, rubs, gallop





- GI/Abdominal


GI/Abdominal exam: Present: soft, tenderness (Epigastric tenderness.  Right and 

left flank tenderness.), normal bowel sounds.  Absent: distended, guarding





- Rectal


Rectal exam: Present: deferred





- Extremities Exam


Extremities exam: Present: normal inspection





- Back Exam


Back exam: Present: normal inspection, full ROM, tenderness, CVA tenderness (R),

 CVA tenderness (L)





- Neurological Exam


Neurological exam: Present: alert, oriented X3





- Psychiatric


Psychiatric exam: Present: normal affect, normal mood





- Skin


Skin exam: Present: warm, dry, intact, normal color.  Absent: rash





ED Course


                                   Vital Signs











  02/11/21 02/11/21 02/12/21





  21:46 23:15 00:45


 


Temperature 99.0 F  


 


Pulse Rate 91 H  78


 


Respiratory 18 16 16





Rate   


 


Blood Pressure 159/110  167/109


 


O2 Sat by Pulse 100  100





Oximetry   














- Reevaluation(s)


Reevaluation #1: 


I discussed current results with patient.  Patient will have a x-ray and CT 

scan.  Patient agrees with plan of care.  Nurse Khanna in room during this entire 

interaction.


02/12/21 00:01











Reevaluation #2: 


For the patient's vaginal discharge I discussed the possibility of doing a 

pelvic exam and the patient refused.  Patient states she prefers to have a 

female provider do her pelvic exam.





I discussed all results with patient.  I discussed plan of care with patient.  

Patient agrees with plan of care and admission.  Patient to be admitted to the 

hospitalist service.





Nurse Frankie in the room the entire interaction.


02/12/21 00:51











- Consultations


Consultation #1: 


I discussed case with nephrologist, Dr. MANCIA.  DR. MANCIA recommends admission to 

the hospital service.  Nephrology states that he will place orders and see the 

patient in the morning.


02/12/21 00:34





Consultation #2: 


Hospitalist consulted for admission.  Hospitalist to admit patient.


02/12/21 00:51








SUNIL score





- Sunil Score


Age > 65: (0) No


Aspirin use within the Past 7 Days: (0) No


3 or more CAD Risk Factors: (0) No


2 or more Angina events in past 24 hrs: (0) No


Known CAD with more than 50% Stenosis: (0) No


Elevated Cardiac Markers: (0) No


ST Deviation Greater than 0.5mm: (0) No


SUNIL Score: 0





ED Medical Decision Making





- Lab Data


Result diagrams: 


                                 02/11/21 21:58





                                 02/11/21 23:09





- EKG Data


-: EKG Interpreted by Me


EKG shows normal: sinus rhythm, axis, intervals, QRS complexes, ST-T waves


Rate: normal





- Radiology Data


Radiology results: report reviewed, image reviewed


interpreted by me: 





Chest x-ray: No pneumonia, no pneumothorax, no foreign body, no osseous 

findings, no acute findings














 CHEST 2 VIEWS 





INDICATION / CLINICAL INFORMATION: chest pain. 





COMPARISON: 03/29/20 





FINDINGS: 





SUPPORT DEVICES: None. 


HEART / MEDIASTINUM: No significant abnormality. 


LUNGS / PLEURA: No significant pulmonary or pleural abnormality. No pne

umothorax. 





ADDITIONAL FINDINGS: No significant additional findings. 





IMPRESSION: 


1. No acute findings. No change. 


============================================ 





CT ABDOMEN AND PELVIS WITHOUT CONTRAST 





INDICATION / CLINICAL INFORMATION: Bi-Lateral flank pain. 





TECHNIQUE: Axial CT images were obtained through the abdomen and pelvis without 

IV contrast. All 


 CT scans at this location are performed using CT dose reduction for ALARA by 

means of automated 


 exposure control. 





COMPARISON: None available. 





FINDINGS: 





LOWER CHEST: No significant abnormality. 


LIVER: No significant abnormality. 


GALLBLADDER: No significant abnormality. 


BILE DUCTS: No significant abnormality. 


PANCREAS: No significant abnormality. 


SPLEEN: No significant abnormality. 


ADRENALS: No significant abnormality. 


RIGHT KIDNEY / URETER: No significant abnormality. 


LEFT KIDNEY / URETER: No significant abnormality. 





STOMACH / SMALL BOWEL: No significant abnormality. 


COLON: No acute abnormality. Moderate amount of fecal material throughout the 

colon. 


APPENDIX: No significant abnormality. 


PERITONEUM: No free fluid. No free air. No fluid collection. 


LYMPH NODES: No significant adenopathy. 


AORTA / ARTERIES: No significant abnormality. 


IVC / VEINS: No significant abnormality. 





URINARY BLADDER: No significant abnormality. 


REPRODUCTIVE ORGANS: No significant abnormality. 





ADDITIONAL FINDINGS: None. 





SKELETAL SYSTEM: No significant abnormality. 





IMPRESSION: 


1. No acute findings in the abdomen or pelvis. No urinary tract stones or 

hydronephrosis. 


============================================ 








- Medical Decision Making





Patient is a 20-year-old female that presents emergency room with complaints of 

chest pain, leg pain, back pain, headache, dysuria.  Patient had labs done.  

Patient's labs are significant for anemia and acute renal failure.  The 

chemistry was rechecked to verify validity.  Patient was given saline bolus 

after the acute renal failure was verified.  Patient's only past medical history

 is lupus but patient has never had any lupus flareups or problems from her 

lupus.  Patient had a CT scan of the abdomen for flank pain.  Patient CT scan of

 the abdomen was negative for acute findings.  Patient had a chest x-ray was 

negative for acute finding.  Nephrology consulted while in the ER.  Patient 

admitted to the hospitalist service.  Of evaluation and treatment.  





Patient refused pelvic exam for vaginal discharge.











- Differential Diagnosis


Chest pain, back pain, kidney stone, flank pain, UTI


Critical Care Time: Yes


Critical care time in (mins) excluding proc time.: 35


Critical care attestation.: 


If time is entered above; I have spent that time in minutes in the direct care 

of this critically ill patient, excluding procedure time.





Critical Care Time: 





35 MINUTES











ED Disposition


Clinical Impression: 


 Flank pain, acute, Chest wall pain, Nephrotic syndrome, Dysuria





Back pain


Qualifiers:


 Back pain location: low back pain Chronicity: acute Back pain laterality: 

bilateral Sciatica presence: without sciatica Qualified Code(s): M54.5 - Low 

back pain





Chest pain


Qualifiers:


 Chest pain type: unspecified Qualified Code(s): R07.9 - Chest pain, unspecified





Headache


Qualifiers:


 Headache type: unspecified Headache chronicity pattern: acute headache Intract

ability: not intractable Qualified Code(s): R51.9 - Headache, unspecified





Acute kidney failure


Qualifiers:


 Acute renal failure type: unspecified Qualified Code(s): N17.9 - Acute kidney f

ailure, unspecified





Disposition: [id number] OP ADMIT IP TO THIS [facility]


Is pt being admited?: Yes


Does the pt Need Aspirin: No


Condition: Critical


Time of Disposition: 00:53

## 2021-02-12 LAB
BAND NEUTROPHILS # (MANUAL): 0 K/MM3
BASOPHILS # (AUTO): 0 K/MM3 (ref 0–0.1)
BASOPHILS NFR BLD AUTO: 0.2 % (ref 0–1.8)
BILIRUB UR QL STRIP: (no result)
BLOOD UR QL VISUAL: (no result)
BUN SERPL-MCNC: 51 MG/DL (ref 7–17)
BUN/CREAT SERPL: 8 %
CALCIUM SERPL-MCNC: 7.6 MG/DL (ref 8.4–10.2)
CREATININE,URINE: 45.6 MG/DL (ref 0.1–20)
CREATININE,URINE: 46 MG/DL (ref 0.1–20)
EOSINOPHIL # BLD AUTO: 0 K/MM3 (ref 0–0.4)
EOSINOPHIL NFR BLD AUTO: 0.2 % (ref 0–4.3)
HCT VFR BLD CALC: 30 % (ref 30.3–42.9)
HEMOLYSIS INDEX: 38
HGB BLD-MCNC: 9.7 GM/DL (ref 10.1–14.3)
LYMPHOCYTES # BLD AUTO: 0.5 K/MM3 (ref 1.2–5.4)
LYMPHOCYTES NFR BLD AUTO: 3.1 % (ref 13.4–35)
MCHC RBC AUTO-ENTMCNC: 32 % (ref 30–34)
MCV RBC AUTO: 82 FL (ref 79–97)
MONOCYTES # (AUTO): 1.1 K/MM3 (ref 0–0.8)
MONOCYTES % (AUTO): 6.8 % (ref 0–7.3)
MUCOUS THREADS #/AREA URNS HPF: (no result) /HPF
MYELOCYTES # (MANUAL): 0 K/MM3
PH UR STRIP: 7 [PH] (ref 5–7)
PLATELET # BLD: 86 K/MM3 (ref 140–440)
PROMYELOCYTES # (MANUAL): 0 K/MM3
PROT UR STRIP-MCNC: >500 MG/DL
PROTEIN/CREATININE RATIO,URINE: 5.04
RBC # BLD AUTO: 3.66 M/MM3 (ref 3.65–5.03)
RBC #/AREA URNS HPF: 15 /HPF (ref 0–6)
TOTAL CELLS COUNTED BLD: 100
UROBILINOGEN UR-MCNC: < 2 MG/DL (ref ?–2)
WBC #/AREA URNS HPF: 4 /HPF (ref 0–6)

## 2021-02-12 RX ADMIN — HYDRALAZINE HYDROCHLORIDE PRN MG: 20 INJECTION INTRAMUSCULAR; INTRAVENOUS at 05:43

## 2021-02-12 RX ADMIN — ACETAMINOPHEN PRN MG: 325 TABLET ORAL at 21:29

## 2021-02-12 RX ADMIN — MORPHINE SULFATE PRN MG: 2 INJECTION, SOLUTION INTRAMUSCULAR; INTRAVENOUS at 06:57

## 2021-02-12 RX ADMIN — Medication SCH ML: at 21:30

## 2021-02-12 RX ADMIN — HYDRALAZINE HYDROCHLORIDE PRN MG: 20 INJECTION INTRAMUSCULAR; INTRAVENOUS at 18:01

## 2021-02-12 RX ADMIN — HEPARIN SODIUM SCH UNIT: 5000 INJECTION, SOLUTION INTRAVENOUS; SUBCUTANEOUS at 21:29

## 2021-02-12 RX ADMIN — FLUTICASONE PROPIONATE SCH MCG: 50 SPRAY, METERED NASAL at 18:50

## 2021-02-12 RX ADMIN — CEFTRIAXONE SODIUM SCH MLS/HR: 1 INJECTION, POWDER, FOR SOLUTION INTRAMUSCULAR; INTRAVENOUS at 02:11

## 2021-02-12 RX ADMIN — CALCIUM SCH MG: 500 TABLET ORAL at 21:29

## 2021-02-12 RX ADMIN — MORPHINE SULFATE PRN MG: 2 INJECTION, SOLUTION INTRAMUSCULAR; INTRAVENOUS at 18:37

## 2021-02-12 RX ADMIN — Medication SCH ML: at 11:16

## 2021-02-12 RX ADMIN — ONDANSETRON PRN MG: 2 INJECTION INTRAMUSCULAR; INTRAVENOUS at 06:57

## 2021-02-12 RX ADMIN — ONDANSETRON PRN MG: 2 INJECTION INTRAMUSCULAR; INTRAVENOUS at 15:29

## 2021-02-12 RX ADMIN — FAMOTIDINE SCH MG: 10 TABLET ORAL at 21:29

## 2021-02-12 RX ADMIN — HEPARIN SODIUM SCH UNIT: 5000 INJECTION, SOLUTION INTRAVENOUS; SUBCUTANEOUS at 05:44

## 2021-02-12 RX ADMIN — CALCIUM SCH MG: 500 TABLET ORAL at 12:32

## 2021-02-12 RX ADMIN — MORPHINE SULFATE PRN MG: 2 INJECTION, SOLUTION INTRAMUSCULAR; INTRAVENOUS at 11:43

## 2021-02-12 RX ADMIN — FAMOTIDINE SCH MG: 10 TABLET ORAL at 11:15

## 2021-02-12 RX ADMIN — HEPARIN SODIUM SCH UNIT: 5000 INJECTION, SOLUTION INTRAVENOUS; SUBCUTANEOUS at 15:29

## 2021-02-12 NOTE — HISTORY AND PHYSICAL REPORT
History of Present Illness


Date of examination: 02/12/21


Chief complaint: 





Chest pain, back pain headache and dysuria


History of present illness: 





20-year-old female was brought to the emergency room because of chest pain, back

pain headache and burning sensation during micturition since 7 AM this morning


Patient complained of bilateral chest pain is a 10 out of 10.  Patient states 

that her chest pain is better with rest worse with palpation.  





Patient states that her back pain is a 10 out of 10.  Patient states that the 

back pain is better with rest and worse with movement.  Patient states that it 

is her lower back and flank area that she is hurting.





Patient states her headache is mild. Patient states her headache is a 3 out of 

10.  Patient states her headache is better with rest and worse with movement and

exertion.  Patient denies blurry vision.  Patient denies neck stiffness.  

Patient denies neck pain.  Patient denies fever and chills.





Patient also complains of dysuria.  Patient states that dysuria is been going on

for 2 days.  Patient states that it burns when she urinates.  Patient denies 

blood in her urine.





Patient states she is also got a vaginal discharge.  Patient states her vaginal 

discharge is white and has been going on for 2 weeks.  Patient states she was s

een at a local family practice and was given treatment but did not have a pelvic

exam.











Medications and Allergies


                                    Allergies











Allergy/AdvReac Type Severity Reaction Status Date / Time


 


No Known Allergies Allergy   Verified 03/29/20 20:14











                                Home Medications











 Medication  Instructions  Recorded  Confirmed  Last Taken  Type


 


Fluconazole (Nf) [Diflucan TAB] 150 mg PO ONCE #2 tablet 07/10/19  Unknown Rx


 


metroNIDAZOLE [Flagyl TAB] 500 mg PO Q12HR #14 tab 12/13/19  Unknown Rx


 


Cetirizine HCl [ZyrTEC] 10 mg PO QAM 14 Days #14 capsule 03/29/20  Unknown Rx


 


Fluticasone [Flonase] 1 spray NS QDAY 14 Days #1 bottle 03/29/20  Unknown Rx











Active Meds: 


Active Medications





Fluticasone Propionate (Fluticasone Propionate Nasal Spray 16 Gm)  50 mcg NS 

QDAY KETTY


Miscellaneous Medication (Fluconazole (Nf))  150 mg PO ONCE KETTY











Review of Systems


Cardiovascular: chest pain


Genitourinary Female: dysuria, urinary frequency, urgency


Musculoskeletal: low back pain


Neurological: headaches





Exam





- Constitutional


Vitals: 


                                        











Temp Pulse Resp BP Pulse Ox


 


 99.0 F   78   16   167/109   100 


 


 02/11/21 21:46  02/12/21 00:45  02/12/21 00:45  02/12/21 00:45  02/12/21 00:45











General appearance: Present: no acute distress, well-nourished





- EENT


Eyes: Present: PERRL


ENT: hearing intact, clear oral mucosa





- Neck


Neck: Present: supple, normal ROM





- Respiratory


Respiratory effort: normal


Respiratory: bilateral: diminished





- Cardiovascular


Heart Sounds: Present: S1 & S2.  Absent: rub, click





- Extremities


Extremities: pulses symmetrical, No edema


Peripheral Pulses: within normal limits





- Abdominal


General gastrointestinal: Present: soft, non-tender, non-distended, normal bowel

 sounds


Female genitourinary: Present: normal





- Integumentary


Integumentary: Present: clear, warm, dry





- Musculoskeletal


Musculoskeletal: gait normal, strength equal bilaterally





- Psychiatric


Psychiatric: appropriate mood/affect, intact judgment & insight





- Neurologic


Neurologic: CNII-XII intact, moves all extremities





HEART Score





- HEART Score


EKG: Normal


Age: < 45


Risk factors: No known risk factors


Troponin: < normal limit





Results





- Labs


CBC & Chem 7: 


                                 02/11/21 21:58





                                 02/11/21 23:09


Labs: 


                             Laboratory Last Values











WBC  13.0 K/mm3 (4.5-11.0)  H  02/11/21  21:58    


 


RBC  3.69 M/mm3 (3.65-5.03)   02/11/21  21:58    


 


Hgb  9.9 gm/dl (10.1-14.3)  L  02/11/21  21:58    


 


Hct  30.4 % (30.3-42.9)   02/11/21  21:58    


 


MCV  82 fl (79-97)   02/11/21  21:58    


 


MCH  27 pg (28-32)  L  02/11/21  21:58    


 


MCHC  32 % (30-34)   02/11/21  21:58    


 


RDW  14.2 % (13.2-15.2)   02/11/21  21:58    


 


Plt Count  120 K/mm3 (140-440)  L  02/11/21  21:58    


 


Lymph % (Auto)  8.5 % (13.4-35.0)  L  02/11/21  21:58    


 


Mono % (Auto)  9.0 % (0.0-7.3)  H  02/11/21  21:58    


 


Eos % (Auto)  1.9 % (0.0-4.3)   02/11/21  21:58    


 


Baso % (Auto)  0.4 % (0.0-1.8)   02/11/21  21:58    


 


Lymph # (Auto)  1.1 K/mm3 (1.2-5.4)  L  02/11/21  21:58    


 


Mono # (Auto)  1.2 K/mm3 (0.0-0.8)  H  02/11/21  21:58    


 


Eos # (Auto)  0.2 K/mm3 (0.0-0.4)   02/11/21  21:58    


 


Baso # (Auto)  0.1 K/mm3 (0.0-0.1)   02/11/21  21:58    


 


Seg Neutrophils %  80.2 % (40.0-70.0)  H  02/11/21  21:58    


 


Seg Neutrophils #  10.4 K/mm3 (1.8-7.7)  H  02/11/21  21:58    


 


Sodium  136 mmol/L (137-145)  L  02/11/21  23:09    


 


Potassium  4.6 mmol/L (3.6-5.0)   02/11/21  23:09    


 


Chloride  102.5 mmol/L ()   02/11/21  23:09    


 


Carbon Dioxide  17 mmol/L (22-30)  L  02/11/21  23:09    


 


Anion Gap  21 mmol/L  02/11/21  23:09    


 


BUN  58 mg/dL (7-17)  H  02/11/21  23:09    


 


Creatinine  6.5 mg/dL (0.6-1.2)  H  02/11/21  23:09    


 


Estimated GFR  10 ml/min  02/11/21  23:09    


 


BUN/Creatinine Ratio  9 %  02/11/21  23:09    


 


Glucose  90 mg/dL ()   02/11/21  23:09    


 


Calcium  7.9 mg/dL (8.4-10.2)  L  02/11/21  23:09    


 


Lipase  71 units/L (13-60)  H  02/11/21  23:09    


 


Urine Color  Straw  (Yellow)   02/11/21  22:00    


 


Urine Turbidity  Clear  (Clear)   02/11/21  22:00    


 


Urine pH  7.0  (5.0-7.0)   02/11/21  22:00    


 


Ur Specific Gravity  1.008  (1.003-1.030)   02/11/21  22:00    


 


Urine Protein  >500 mg/dL (Negative)   02/11/21  22:00    


 


Urine Glucose (UA)  50 mg/dL (Negative)   02/11/21  22:00    


 


Urine Ketones  Neg mg/dL (Negative)   02/11/21  22:00    


 


Urine Blood  Sm  (Negative)   02/11/21  22:00    


 


Urine Nitrite  Neg  (Negative)   02/11/21  22:00    


 


Urine Bilirubin  Neg  (Negative)   02/11/21  22:00    


 


Urine Urobilinogen  < 2.0 mg/dL (<2.0)   02/11/21  22:00    


 


Ur Leukocyte Esterase  Neg  (Negative)   02/11/21  22:00    


 


Urine WBC (Auto)  4.0 /HPF (0.0-6.0)   02/11/21  22:00    


 


Urine RBC (Auto)  13.0 /HPF (0.0-6.0)   02/11/21  22:00    


 


U Epithel Cells (Auto)  1.0 /HPF (0-13.0)   02/11/21  22:00    


 


Hyaline Casts  1 /LPF  02/11/21  22:00    


 


Urine HCG, Qual  Negative  (Negative)   02/11/21  22:00    














- Imaging and Cardiology


Chest x-ray: image reviewed


CT scan - abdomen: image reviewed





Assessment and Plan





- Patient Problems


(1) Acute kidney failure


Current Visit: Yes   Status: Acute   


Qualifiers: 


   Acute renal failure type: unspecified   Qualified Code(s): N17.9 - Acute 

kidney failure, unspecified   


Plan to address problem: 


Admit the patient to the medical floor.  Put the patient on cardiac diet.  IV 

fluid half-normal saline at the rate of 100 cc/h.  Avoid nephrotoxic drug.  We 

will consult nephrology for further evaluation and treatment.  Renal ultrasound.

  Recheck CBC BMP in the morning.  Heparin 5000 units subcu every 8 hours for 

DVT prophylaxis and Pepcid 20 mg p.o. twice daily for GI prophylaxis








(2) Back pain


Current Visit: Yes   Status: Acute   


Qualifiers: 


   Back pain location: low back pain   Chronicity: acute   Back pain laterality:

 bilateral   Sciatica presence: without sciatica   Qualified Code(s): M54.5 - 

Low back pain   





(3) Chest pain


Current Visit: Yes   Status: Acute   


Qualifiers: 


   Chest pain type: unspecified   Qualified Code(s): R07.9 - Chest pain, 

unspecified   


Plan to address problem: 


We put the patient on aspirin 81 mg p.o. daily and Lipitor 40 mg p.o. daily.  

Nitroglycerin as needed.  We will do the serial troponin.  We also order an 

echocardiogram.  Heparin 5000 units subcu every 8 hours.  Will consult 

cardiology if needed








(4) Dysuria


Current Visit: Yes   Status: Acute   


Plan to address problem: 


We seen the urine for analysis and culture.  Rocephin 1 g IV daily.  Patient is 

also on Diflucan.








(5) Headache


Current Visit: Yes   Status: Acute   


Qualifiers: 


   Headache type: unspecified   Headache chronicity pattern: acute headache   

Intractability: not intractable   Qualified Code(s): R51.9 - Headache, unspecifi

ed   


Plan to address problem: 


Tylenol 650 p.o. every 6 hours as needed.  Morphine 1 to 2 mg IV every 4 hours 

as needed.

## 2021-02-12 NOTE — ULTRASOUND REPORT
ULTRASOUND RENAL



INDICATION / CLINICAL INFORMATION:

beatriz.



COMPARISON:

CT abdomen pelvis without contrast 2/12/2021



FINDINGS:

RIGHT KIDNEY: Length = 10.0 cm.    [normal > 9 cm] 

- Parenchymal Thickness = 1.6 cm.  [normal > 1.5 cm] 

- Echogenicity: Increased with poor corticomedullary differentiation

- Hydronephrosis: None.

- Cyst or mass: No significant abnormality.  

- Stones: None seen. 



LEFT KIDNEY: Length = 8.6 cm.       [normal > 9 cm] 

- Parenchymal Thickness = 1.2 cm.  [normal > 1.5 cm] 

- Echogenicity: Increased with poor corticomedullary differentiation

- Hydronephrosis: None.

- Cyst or mass: No significant abnormality.  

- Stones: None seen. 



URINARY BLADDER: No significant abnormality.

FREE FLUID: None.



ADDITIONAL FINDINGS: None.



IMPRESSION:

 Markedly echogenic kidneys consistent with nonspecific renal parenchymal disease. No focal renal les
ion or hydronephrosis.



Signer Name: Doug Verdin Jr, MD 

Signed: 2/12/2021 9:34 AM

Workstation Name: [id number]

## 2021-02-12 NOTE — CAT SCAN REPORT
CT ABDOMEN AND PELVIS WITHOUT CONTRAST



INDICATION / CLINICAL INFORMATION: Bi-Lateral flank pain.



TECHNIQUE: Axial CT images were obtained through the abdomen and pelvis without IV contrast.  All CT 
scans at this location are performed using CT dose reduction for ALARA by means of automated exposure
 control. 



COMPARISON: None available.



FINDINGS:



LOWER CHEST: No significant abnormality.

LIVER: No significant abnormality.

GALLBLADDER: No significant abnormality.  

BILE DUCTS: No significant abnormality.

PANCREAS: No significant abnormality.

SPLEEN: No significant abnormality.

ADRENALS: No significant abnormality.

RIGHT KIDNEY / URETER: No significant abnormality.

LEFT KIDNEY / URETER: No significant abnormality.



STOMACH / SMALL BOWEL: No significant abnormality. 

COLON: No acute abnormality. Moderate amount of fecal material throughout the colon. 

APPENDIX: No significant abnormality.  

PERITONEUM: No free fluid. No free air. No fluid collection.

LYMPH NODES: No significant adenopathy.

AORTA / ARTERIES: No significant abnormality. 

IVC / VEINS: No significant abnormality.



URINARY BLADDER: No significant abnormality.

REPRODUCTIVE ORGANS: No significant abnormality.



ADDITIONAL FINDINGS: None.



SKELETAL SYSTEM: No significant abnormality.



IMPRESSION:

1. No acute findings in the abdomen or pelvis. No urinary tract stones or hydronephrosis.



Signer Name: EDIN Gonzalez MD 

Signed: 2/12/2021 12:39 AM

Workstation Name: [id number]

## 2021-02-12 NOTE — CONSULTATION
History of Present Illness





- Reason for Consult


Consult date: 02/12/21


acute renal failure





- History of Present Illness


patient with questionable history of lupus was admitted yesterday for worsening 

HA and chest pain, she was found to have elevated Cr and blood pressure and was 

admitted for further work up.


she stated she had kidney biopsy about 4 years ago but she does not know the 

findings and if she lupus nephritis, she stated at some poitn she was in the 

hospital and was evaluated by rheumatology and was told she had lupus, symtpoms 

related to lupus at that times were only joint pain, she does not follow up with

any doctor and did not do any kind f blood work for the last 2 years, she said 

she very low kidney function at some point but it improved, she thinks she was 

treated with steroids in the past but no other immunospression medication.











Past History


Past Medical History: No medical history





Medications and Allergies


                                    Allergies











Allergy/AdvReac Type Severity Reaction Status Date / Time


 


No Known Allergies Allergy   Verified 03/29/20 20:14











                                Home Medications











 Medication  Instructions  Recorded  Confirmed  Last Taken  Type


 


Fluconazole (Nf) [Diflucan TAB] 150 mg PO ONCE #2 tablet 07/10/19  Unknown Rx


 


metroNIDAZOLE [Flagyl TAB] 500 mg PO Q12HR #14 tab 12/13/19  Unknown Rx


 


Cetirizine HCl [ZyrTEC] 10 mg PO QAM 14 Days #14 capsule 03/29/20  Unknown Rx


 


Fluticasone [Flonase] 1 spray NS QDAY 14 Days #1 bottle 03/29/20  Unknown Rx











Active Meds: 


Active Medications





Acetaminophen (Acetaminophen 325 Mg Tab)  650 mg PO Q4H PRN


   PRN Reason: Pain MILD(1-3)/Fever >100.5/HA


Amlodipine Besylate (Amlodipine 10 Mg Tab)  10 mg PO QDAY KETTY


Atorvastatin Calcium (Atorvastatin 20 Mg Tab)  20 mg PO QHS KETTY


Famotidine (Famotidine 10 Mg Tab)  10 mg PO BID KETTY


Fluticasone Propionate (Fluticasone Propionate Nasal Spray 16 Gm)  50 mcg NS 

QDAY KETTY


Heparin Sodium (Porcine) (Heparin 5,000 Unit/1 Ml Vial)  5,000 unit SUB-Q Q8HR 

KETTY


   Last Admin: 02/12/21 05:44 Dose:  5,000 unit


   Documented by: 


Hydralazine HCl (Hydralazine 20 Mg/1 Ml Inj)  10 mg IV Q6HR PRN


   PRN Reason: Blood Pressure


   Last Admin: 02/12/21 05:43 Dose:  10 mg


   Documented by: 


Sodium Chloride (Nacl 0.45% 1000 Ml)  1,000 mls @ 100 mls/hr IV AS DIRECT KETTY


   Last Admin: 02/12/21 02:16 Dose:  100 mls/hr


   Documented by: 


Ceftriaxone Sodium (Rocephin/Ns 1 Gm/50 Ml)  1 gm in 50 mls @ 100 mls/hr IV Q24H

 KETTY; Protocol


   Stop: 02/16/21 02:29


   Last Admin: 02/12/21 02:11 Dose:  100 mls/hr


   Documented by: 


Morphine Sulfate (Morphine 2 Mg/1 Ml Inj)  2 mg IV Q4H PRN


   PRN Reason: Pain, Moderate (4-6)


   Last Admin: 02/12/21 06:57 Dose:  2 mg


   Documented by: 


Ondansetron HCl (Ondansetron 4 Mg/2 Ml Inj)  4 mg IV Q8H PRN


   PRN Reason: Nausea And Vomiting


   Last Admin: 02/12/21 06:57 Dose:  4 mg


   Documented by: 


Sodium Chloride (Sodium Chloride 0.9% 10 Ml Flush Syringe)  10 ml IV BID KETTY


Sodium Chloride (Sodium Chloride 0.9% 10 Ml Flush Syringe)  10 ml IV PRN PRN


   PRN Reason: LINE FLUSH











Review of Systems


All systems: negative (HA and chest pain)





Exam





- Vital Signs


Vital signs: 


                                   Vital Signs











Temp Pulse Resp BP Pulse Ox


 


 99.0 F   91 H  18   159/110   100 


 


 02/11/21 21:46  02/11/21 21:46  02/11/21 21:46  02/11/21 21:46  02/11/21 21:46














- General Appearance


General appearance: well-developed, well-nourished, appears stated age


EENT: ATNC, PERRL, mucous membranes moist


Neck: Present: neck supple


Respiratory: Clear to Ascultation


Heart: regular, S1S2


Gastrointestinal: Present: normoactive bowel sounds.  Absent: tenderness, 

distended


Integumentary: no rash, warm and dry


Neurologic: no focal deficit, no asterixis, alert and oriented x3


Musculoskeletal: Present: other (no edema in BLE)


Psychiatric: mood/affect appropriate, cooperative





Results





- Lab Results





                                 02/12/21 10:15





                                 02/12/21 10:15


                             Most recent lab results











Calcium  7.6 mg/dL (8.4-10.2)  L  02/12/21  10:15    














Assessment and Plan


(1) severe renal failure


(2) HTN


(3) Chest pain


(4) Dysuria


(5) Headache








-unknown baseline, possible advanced CKD secondary to HTN and possible LN


-she not sure if she wants to do kidney biopsy, for now will check renal US and 

serology work up and decide if kidney biopsy is necessary, if so will order for 

Monday. it is unclear if she will benefit from kidney biopsy due to h/o poor 

compliance and she is high risk for complication for immunosuppression


-she is also refusing dialysis if needed, I explained to her at this point, it 

is unclear if she had reversible kidney failure and refusing dialysis can be 

life threatening if no improvement, she understood the risk but cont to refuse 

dialysis tx


-on admission she had low temp with leukocytosis and started on abx, due to 

possible ongoing infection and unclear history for lupus, will hold on using 

pulse -steroids, urine and blood cultures ordered with COVID-19 PCR


-will switch IVF to sodium bicarb gtt and replete calcium





Raul Linares MD


[phone]

## 2021-02-12 NOTE — XRAY REPORT
CHEST 2 VIEWS 



INDICATION / CLINICAL INFORMATION: chest pain.



COMPARISON: 03/29/20



FINDINGS:



SUPPORT DEVICES: None.

HEART / MEDIASTINUM: No significant abnormality. 

LUNGS / PLEURA: No significant pulmonary or pleural abnormality. No pneumothorax. 



ADDITIONAL FINDINGS: No significant additional findings.



IMPRESSION:

1. No acute findings. No change.



Signer Name: EDIN Gonzalez MD 

Signed: 2/12/2021 12:41 AM

Workstation Name: [id number]

## 2021-02-13 LAB
APTT BLD: 50.4 SEC. (ref 24.2–36.6)
BASOPHILS # (AUTO): 0 K/MM3 (ref 0–0.1)
BASOPHILS NFR BLD AUTO: 0.2 % (ref 0–1.8)
BUN SERPL-MCNC: 46 MG/DL (ref 7–17)
BUN/CREAT SERPL: 7 %
CALCIUM SERPL-MCNC: 7.8 MG/DL (ref 8.4–10.2)
EOSINOPHIL # BLD AUTO: 0 K/MM3 (ref 0–0.4)
EOSINOPHIL NFR BLD AUTO: 0.1 % (ref 0–4.3)
HCT VFR BLD CALC: 26.5 % (ref 30.3–42.9)
HEMOLYSIS INDEX: 0
HGB BLD-MCNC: 8.6 GM/DL (ref 10.1–14.3)
INR PPP: 1.21 (ref 0.87–1.13)
IRON SERPL-MCNC: 19 UG/DL (ref 37–170)
LYMPHOCYTES # BLD AUTO: 1 K/MM3 (ref 1.2–5.4)
LYMPHOCYTES NFR BLD AUTO: 5.9 % (ref 13.4–35)
MCHC RBC AUTO-ENTMCNC: 32 % (ref 30–34)
MCV RBC AUTO: 81 FL (ref 79–97)
MONOCYTES # (AUTO): 1.7 K/MM3 (ref 0–0.8)
MONOCYTES % (AUTO): 9.8 % (ref 0–7.3)
PLATELET # BLD: 85 K/MM3 (ref 140–440)
RBC # BLD AUTO: 3.28 M/MM3 (ref 3.65–5.03)
TIBC SERPL-MCNC: 176 MCG/DL (ref 250–450)

## 2021-02-13 RX ADMIN — HEPARIN SODIUM SCH UNIT: 5000 INJECTION, SOLUTION INTRAVENOUS; SUBCUTANEOUS at 13:49

## 2021-02-13 RX ADMIN — CALCIUM SCH MG: 500 TABLET ORAL at 21:49

## 2021-02-13 RX ADMIN — HEPARIN SODIUM SCH UNIT: 5000 INJECTION, SOLUTION INTRAVENOUS; SUBCUTANEOUS at 06:10

## 2021-02-13 RX ADMIN — HYDROXYCHLOROQUINE SULFATE SCH MG: 200 TABLET ORAL at 10:38

## 2021-02-13 RX ADMIN — CEFTRIAXONE SODIUM SCH MLS/HR: 1 INJECTION, POWDER, FOR SOLUTION INTRAMUSCULAR; INTRAVENOUS at 01:38

## 2021-02-13 RX ADMIN — FLUTICASONE PROPIONATE SCH MCG: 50 SPRAY, METERED NASAL at 10:38

## 2021-02-13 RX ADMIN — HEPARIN SODIUM SCH UNIT: 5000 INJECTION, SOLUTION INTRAVENOUS; SUBCUTANEOUS at 21:49

## 2021-02-13 RX ADMIN — ACETAMINOPHEN PRN MG: 325 TABLET ORAL at 04:30

## 2021-02-13 RX ADMIN — Medication SCH: at 11:51

## 2021-02-13 RX ADMIN — HYDRALAZINE HYDROCHLORIDE PRN MG: 20 INJECTION INTRAMUSCULAR; INTRAVENOUS at 04:30

## 2021-02-13 RX ADMIN — FAMOTIDINE SCH MG: 10 TABLET ORAL at 21:50

## 2021-02-13 RX ADMIN — FAMOTIDINE SCH MG: 10 TABLET ORAL at 10:37

## 2021-02-13 RX ADMIN — CALCIUM SCH: 500 TABLET ORAL at 10:52

## 2021-02-13 RX ADMIN — Medication SCH ML: at 21:50

## 2021-02-13 RX ADMIN — PIPERACILLIN SODIUM AND TAZOBACTAM SODIUM SCH MLS/HR: 2; .25 INJECTION, POWDER, LYOPHILIZED, FOR SOLUTION INTRAVENOUS at 10:38

## 2021-02-13 RX ADMIN — PIPERACILLIN SODIUM AND TAZOBACTAM SODIUM SCH MLS/HR: 2; .25 INJECTION, POWDER, LYOPHILIZED, FOR SOLUTION INTRAVENOUS at 16:47

## 2021-02-13 NOTE — PROGRESS NOTE
Assessment and Plan


(1) severe renal failure


(2) HTN


(3) Chest pain


(4) Dysuria


(5) Headache








-unknown baseline, likely advanced CKD secondary to HTN and possible LN


-she is agreeable to a kidney biopsy, Risks explained including risk of bleeding

, her Renal US shows CKD signs and she has nephrotic range proteinuria. Will do 

Kidney biospy once infection ruled out, blood Cx show NGTN.


-Pt says she will decide about dialysis after kidney bx


-on admission she had low temp with leukocytosis and started on abx, due to 

possible ongoing infection and unclear history for lupus, holding using pulse -

steroids for now, follow Blood Cx.


-Check PTH for 2HPT from CKD


-Check C3/C4.


-On IVFs








Subjective


Date of service: 02/13/21


Interval history: 


Denies SHOB. Making urine.





Objective





- Exam


Narrative Exam: 


General appearance: well-developed, well-nourished, appears stated age


EENT: ATNC, PERRL, mucous membranes moist


Neck: Present: neck supple


Respiratory: Clear to Ascultation


Heart: regular, S1S2


Gastrointestinal: Present: normoactive bowel sounds.  Absent: tenderness, 

distended


Integumentary: no rash, warm and dry


Neurologic: no focal deficit, no asterixis, alert and oriented x3


Musculoskeletal: Present: other (no edema in BLE)


Psychiatric: mood/affect appropriate, cooperative





- Vital Signs


Vital signs: 


                               Vital Signs - 12hr











  02/13/21 02/13/21 02/13/21





  04:22 04:25 06:08


 


Temperature 101.5 F H 101.5 F H 100.1 F H


 


Pulse Rate 111 H 111 H 113 H


 


Respiratory 20 20 18





Rate   


 


Blood Pressure 151/101  125/74


 


Blood Pressure  151/101 





[Right]   


 


O2 Sat by Pulse 99 99 98





Oximetry   














  02/13/21





  10:52


 


Temperature 


 


Pulse Rate 100 H


 


Respiratory 





Rate 


 


Blood Pressure 127/84


 


Blood Pressure 





[Right] 


 


O2 Sat by Pulse 





Oximetry 














- Lab





                                 02/13/21 04:40





                                 02/13/21 04:40


                             Most recent lab results











Calcium  7.8 mg/dL (8.4-10.2)  L  02/13/21  04:40    


 


Phosphorus  3.50 mg/dL (2.5-4.5)   02/13/21  04:40    


 


Urine Creatinine  45.6 mg/dL (0.1-20.0)  H  02/12/21  15:38    


 


Urine Creatinine  46.0 mg/dL (0.1-20.0)  H  02/12/21  15:38    


 


Urine Sodium  104 mmol/L  02/12/21  15:38    


 


Urine Total Protein  230 mg/dL (5-11.8)  H  02/12/21  15:38    














Medications & Allergies





- Medications


Allergies/Adverse Reactions: 


                                    Allergies





No Known Allergies Allergy (Verified 03/29/20 20:14)


   








Home Medications: 


                                Home Medications











 Medication  Instructions  Recorded  Confirmed  Last Taken  Type


 


Fluconazole (Nf) [Diflucan TAB] 150 mg PO ONCE #2 tablet 07/10/19  Unknown Rx


 


metroNIDAZOLE [Flagyl TAB] 500 mg PO Q12HR #14 tab 12/13/19  Unknown Rx


 


Cetirizine HCl [ZyrTEC] 10 mg PO QAM 14 Days #14 capsule 03/29/20  Unknown Rx


 


Fluticasone [Flonase] 1 spray NS QDAY 14 Days #1 bottle 03/29/20  Unknown Rx











Active Medications: 














Generic Name Dose Route Start Last Admin





  Trade Name Freq  PRN Reason Stop Dose Admin


 


Acetaminophen  650 mg  02/12/21 01:07  02/13/21 04:30





  Acetaminophen 325 Mg Tab  PO   650 mg





  Q4H PRN   Administration





  Pain MILD(1-3)/Fever >100.5/HA  


 


Amlodipine Besylate  10 mg  02/12/21 12:00  02/13/21 10:52





  Amlodipine 10 Mg Tab  PO   10 mg





  QDAY KETTY   Administration


 


Atorvastatin Calcium  20 mg  02/12/21 22:00  02/12/21 21:29





  Atorvastatin 20 Mg Tab  PO   20 mg





  QHS KETTY   Administration


 


Calcium Carbonate/Glycine  1,250 mg  02/12/21 12:00  02/13/21 10:52





  Calcium Carbonate 1250 Mg Tab  PO   Not Given





  BID KETTY  


 


Famotidine  10 mg  02/12/21 10:00  02/13/21 10:37





  Famotidine 10 Mg Tab  PO   10 mg





  BID KETTY   Administration


 


Fluticasone Propionate  50 mcg  02/12/21 10:00  02/13/21 10:38





  Fluticasone Propionate Nasal Spray 16 Gm  NS   50 mcg





  QDAY KETTY   Administration


 


Heparin Sodium (Porcine)  5,000 unit  02/12/21 06:00  02/13/21 06:10





  Heparin 5,000 Unit/1 Ml Vial  SUB-Q   5,000 unit





  Q8HR KETTY   Administration


 


Hydralazine HCl  10 mg  02/12/21 05:12  02/13/21 04:30





  Hydralazine 20 Mg/1 Ml Inj  IV   10 mg





  Q6HR PRN   Administration





  Blood Pressure  


 


Hydroxychloroquine Sulfate  200 mg  02/13/21 10:00  02/13/21 10:38





  Hydroxychloroquine 200 Mg Tab  PO   200 mg





  QDAY KETTY   Administration


 


Piperacillin Sod/Tazobactam Sod  2.25 gm in 50 mls @ 100 mls/hr  02/13/21 09:00 

 02/13/21 10:38





  Zosyn/Ns 2.25 Gm/50ml  IV   100 mls/hr





  Q8H KETTY   Administration


 


Morphine Sulfate  2 mg  02/12/21 01:07  02/12/21 18:37





  Morphine 2 Mg/1 Ml Inj  IV   2 mg





  Q4H PRN   Administration





  Pain, Moderate (4-6)  


 


Ondansetron HCl  4 mg  02/12/21 01:07  02/12/21 15:29





  Ondansetron 4 Mg/2 Ml Inj  IV   4 mg





  Q8H PRN   Administration





  Nausea And Vomiting  


 


Prednisone  10 mg  02/13/21 10:00  02/13/21 10:38





  Prednisone 10 Mg Tab  PO   10 mg





  QDAY KETTY   Administration


 


Sodium Chloride  10 ml  02/12/21 10:00  02/12/21 21:30





  Sodium Chloride 0.9% 10 Ml Flush Syringe  IV   10 ml





  BID KETTY   Administration


 


Sodium Chloride  10 ml  02/12/21 01:07  02/13/21 04:30





  Sodium Chloride 0.9% 10 Ml Flush Syringe  IV   10 ml





  PRN PRN   Administration





  LINE FLUSH

## 2021-02-13 NOTE — PROGRESS NOTE
Assessment and Plan


Assessment and plan: 





(1) Acute kidney failure, lupus nephritis


Current Visit: Yes   Status: Acute   


Qualifiers: 


   Acute renal failure type: unspecified   Qualified Code(s): N17.9 - Acute 

kidney failure, unspecified   


Plan to address problem: 


Admit the patient to the medical floor.  Put the patient on cardiac diet.  IV 

fluid half-normal saline at the rate of 100 cc/h.  Avoid nephrotoxic drug.  We 

will consult nephrology for further evaluation and treatment.  Renal ultrasound.

 Recheck CBC BMP in the morning.  Heparin 5000 units subcu every 8 hours for DVT

prophylaxis and Pepcid 20 mg p.o. twice daily for GI prophylaxis








(2) Back pain


Current Visit: Yes   Status: Acute   


Qualifiers: 


   Back pain location: low back pain   Chronicity: acute   Back pain laterality:

bilateral   Sciatica presence: without sciatica   Qualified Code(s): M54.5 - Low

back pain   





(3) Chest pain


Current Visit: Yes   Status: Acute   


Qualifiers: 


   Chest pain type: unspecified   Qualified Code(s): R07.9 - Chest pain, 

unspecified   


Plan to address problem: 


We put the patient on aspirin 81 mg p.o. daily and Lipitor 40 mg p.o. daily.  

Nitroglycerin as needed.  We will do the serial troponin.  We also order an 

echocardiogram.  Heparin 5000 units subcu every 8 hours.  Will consult 

cardiology if needed








(4) sepsis


Current Visit: Yes   Status: Acute   


Plan to address problem: 


We seen the urine for analysis and culture.  Rocephin 1 g IV daily.  Patient is 

also on Diflucan.








(5) Headache


Current Visit: Yes   Status: Acute   


Qualifiers: 


   Headache type: unspecified   Headache chronicity pattern: acute headache   

Intractability: not intractable   Qualified Code(s): R51.9 - Headache, 

unspecified   


Plan to address problem: 


Tylenol 650 p.o. every 6 hours as needed.  Morphine 1 to 2 mg IV every 4 hours 

as needed.











2/13/2021


-Patient is septic evidenced by leukocytosis and fever.  Patient is on IV Zosyn.

 UA is negative


-Patient has acute renal failure versus acute on chronic renal failure, with GFR

of 10, nephrology is following.  Patient has lupus and likely due to lupus 

nephritis


-Pain is likely due to lupus, I placed the patient on steroids and 

hydroxychloroquine


-Pain control








History


Interval history: 





Patient was seen and evaluated this morning


Patient said pain is getting better





Hospitalist Physical





- Physical exam


Narrative exam: 





 Not in cardiopulmonary distress. 


 The patient appeared well nourished and normally developed.


 Vital signs as documented.


 Head exam is unremarkable.


 No scleral icterus .


 Neck is without jugular venous distension, thyromegaly, or carotid bruits. 


 Lungs are clear to auscultation.


Cardiac exam reveals regular rate and  Rhythm. 


Abdominal exam reveals normal bowel sounds, nontender, no organomegaly.


Extremities are nonedematous and both femoral and pedal pulses are normal.


CNS: Alert and oriented 3.  No focal weakness.





- Constitutional


Vitals: 


                                        











Temp Pulse Resp BP Pulse Ox


 


 100.1 F H  113 H  18   125/74   98 


 


 02/13/21 06:08  02/13/21 06:08  02/13/21 06:08  02/13/21 06:08  02/13/21 06:08











General appearance: Present: no acute distress, well-nourished





HEART Score





- HEART Score


EKG: Normal


Age: < 45


Risk factors: No known risk factors


Troponin: 


                                        











Troponin T  < 0.010 ng/mL (0.00-0.029)   02/12/21  01:08    











Troponin: < normal limit





Results





- Labs


CBC & Chem 7: 


                                 02/13/21 04:40





                                 02/13/21 04:40


Labs: 


                             Laboratory Last Values











WBC  17.4 K/mm3 (4.5-11.0)  H  02/13/21  04:40    


 


RBC  3.28 M/mm3 (3.65-5.03)  L  02/13/21  04:40    


 


Hgb  8.6 gm/dl (10.1-14.3)  L  02/13/21  04:40    


 


Hct  26.5 % (30.3-42.9)  L  02/13/21  04:40    


 


MCV  81 fl (79-97)   02/13/21  04:40    


 


MCH  26 pg (28-32)  L  02/13/21  04:40    


 


MCHC  32 % (30-34)   02/13/21  04:40    


 


RDW  14.1 % (13.2-15.2)   02/13/21  04:40    


 


Plt Count  85 K/mm3 (140-440)  L  02/13/21  04:40    


 


Lymph % (Auto)  5.9 % (13.4-35.0)  L  02/13/21  04:40    


 


Mono % (Auto)  9.8 % (0.0-7.3)  H  02/13/21  04:40    


 


Eos % (Auto)  0.1 % (0.0-4.3)   02/13/21  04:40    


 


Baso % (Auto)  0.2 % (0.0-1.8)   02/13/21  04:40    


 


Lymph # (Auto)  1.0 K/mm3 (1.2-5.4)  L  02/13/21  04:40    


 


Mono # (Auto)  1.7 K/mm3 (0.0-0.8)  H  02/13/21  04:40    


 


Eos # (Auto)  0.0 K/mm3 (0.0-0.4)   02/13/21  04:40    


 


Baso # (Auto)  0.0 K/mm3 (0.0-0.1)   02/13/21  04:40    


 


Add Manual Diff  Complete   02/12/21  10:15    


 


Total Counted  100   02/12/21  10:15    


 


Seg Neutrophils %  84.0 % (40.0-70.0)  H  02/13/21  04:40    


 


Seg Neuts % (Manual)  97.0 % (40.0-70.0)  H  02/12/21  10:15    


 


Lymphocytes % (Manual)  2.0 % (13.4-35.0)  L  02/12/21  10:15    


 


Monocytes % (Manual)  1.0 % (0.0-7.3)   02/12/21  10:15    


 


Nucleated RBC %  Not Reportable   02/12/21  10:15    


 


Seg Neutrophils #  14.6 K/mm3 (1.8-7.7)  H  02/13/21  04:40    


 


Seg Neutrophils # Man  16.3 K/mm3 (1.8-7.7)  H  02/12/21  10:15    


 


Band Neutrophils #  0.0 K/mm3  02/12/21  10:15    


 


Lymphocytes # (Manual)  0.3 K/mm3 (1.2-5.4)  L  02/12/21  10:15    


 


Abs React Lymphs (Man)  0.0 K/mm3  02/12/21  10:15    


 


Monocytes # (Manual)  0.2 K/mm3 (0.0-0.8)   02/12/21  10:15    


 


Eosinophils # (Manual)  0.0 K/mm3 (0.0-0.4)   02/12/21  10:15    


 


Basophils # (Manual)  0.0 K/mm3 (0.0-0.1)   02/12/21  10:15    


 


Metamyelocytes #  0.0 K/mm3  02/12/21  10:15    


 


Myelocytes #  0.0 K/mm3  02/12/21  10:15    


 


Promyelocytes #  0.0 K/mm3  02/12/21  10:15    


 


Blast Cells #  0.0 K/mm3  02/12/21  10:15    


 


WBC Morphology  Not Reportable   02/12/21  10:15    


 


Hypersegmented Neuts  Not Reportable   02/12/21  10:15    


 


Hyposegmented Neuts  Not Reportable   02/12/21  10:15    


 


Hypogranular Neuts  Not Reportable   02/12/21  10:15    


 


Smudge Cells  Not Reportable   02/12/21  10:15    


 


Toxic Granulation  Not Reportable   02/12/21  10:15    


 


Toxic Vacuolation  Not Reportable   02/12/21  10:15    


 


Dohle Bodies  Not Reportable   02/12/21  10:15    


 


Pelger-Huet Anomaly  Not Reportable   02/12/21  10:15    


 


Wendy Rods  Not Reportable   02/12/21  10:15    


 


Platelet Estimate  Consistent w auto   02/12/21  10:15    


 


Clumped Platelets  Not Reportable   02/12/21  10:15    


 


Plt Clumps, EDTA  Not Reportable   02/12/21  10:15    


 


Large Platelets  Not Reportable   02/12/21  10:15    


 


Giant Platelets  Rare   02/12/21  10:15    


 


Platelet Satelliting  Not Reportable   02/12/21  10:15    


 


Plt Morphology Comment  Not Reportable   02/12/21  10:15    


 


RBC Morphology  Normal   02/12/21  10:15    


 


Dimorphic RBCs  Not Reportable   02/12/21  10:15    


 


Polychromasia  Not Reportable   02/12/21  10:15    


 


Hypochromasia  Not Reportable   02/12/21  10:15    


 


Poikilocytosis  Not Reportable   02/12/21  10:15    


 


Anisocytosis  Not Reportable   02/12/21  10:15    


 


Microcytosis  Not Reportable   02/12/21  10:15    


 


Macrocytosis  Not Reportable   02/12/21  10:15    


 


Spherocytes  Not Reportable   02/12/21  10:15    


 


Pappenheimer Bodies  Not Reportable   02/12/21  10:15    


 


Sickle Cells  Not Reportable   02/12/21  10:15    


 


Target Cells  Not Reportable   02/12/21  10:15    


 


Tear Drop Cells  Not Reportable   02/12/21  10:15    


 


Ovalocytes  Not Reportable   02/12/21  10:15    


 


Helmet Cells  Not Reportable   02/12/21  10:15    


 


[place] Bodies  Not Reportable   02/12/21  10:15    


 


Cabot Rings  Not Reportable   02/12/21  10:15    


 


[place] Cells  Not Reportable   02/12/21  10:15    


 


Bite Cells  Not Reportable   02/12/21  10:15    


 


Crenated Cell  Not Reportable   02/12/21  10:15    


 


Elliptocytes  Not Reportable   02/12/21  10:15    


 


Acanthocytes (Spur)  Not Reportable   02/12/21  10:15    


 


Rouleaux  Not Reportable   02/12/21  10:15    


 


Hemoglobin C Crystals  Not Reportable   02/12/21  10:15    


 


Schistocytes  Rare   02/12/21  10:15    


 


Malaria parasites  Not Reportable   02/12/21  10:15    


 


Kurtis Bodies  Not Reportable   02/12/21  10:15    


 


Hem Pathologist Commnt  No   02/12/21  10:15    


 


Sodium  133 mmol/L (137-145)  L  02/13/21  04:40    


 


Potassium  3.8 mmol/L (3.6-5.0)   02/13/21  04:40    


 


Chloride  99.4 mmol/L ()   02/13/21  04:40    


 


Carbon Dioxide  23 mmol/L (22-30)  D 02/13/21  04:40    


 


Anion Gap  14 mmol/L  02/13/21  04:40    


 


BUN  46 mg/dL (7-17)  H  02/13/21  04:40    


 


Creatinine  6.5 mg/dL (0.6-1.2)  H  02/13/21  04:40    


 


Estimated GFR  10 ml/min  02/13/21  04:40    


 


BUN/Creatinine Ratio  7 %  02/13/21  04:40    


 


Glucose  94 mg/dL ()   02/13/21  04:40    


 


Uric Acid  7.1 mg/dL (3.5-7.6)   02/12/21  14:10    


 


Calcium  7.8 mg/dL (8.4-10.2)  L  02/13/21  04:40    


 


Phosphorus  3.50 mg/dL (2.5-4.5)   02/13/21  04:40    


 


Iron  19 ug/dL ()  L  02/13/21  04:40    


 


TIBC  176 mcg/dL (250-450)  L  02/13/21  04:40    


 


Lactate Dehydrogenase  296 units/L ()  H  02/12/21  10:15    


 


Total Creatine Kinase  152 units/L ()  H  02/12/21  10:15    


 


Troponin T  < 0.010 ng/mL (0.00-0.029)   02/12/21  01:08    


 


Lipase  71 units/L (13-60)  H  02/11/21  23:09    


 


Urine Color  Colorless  (Yellow)   02/12/21  01:10    


 


Urine Turbidity  Clear  (Clear)   02/12/21  01:10    


 


Urine pH  7.0  (5.0-7.0)   02/12/21  01:10    


 


Ur Specific Gravity  1.007  (1.003-1.030)   02/12/21  01:10    


 


Urine Protein  >500 mg/dL (Negative)   02/12/21  01:10    


 


Urine Glucose (UA)  50 mg/dL (Negative)   02/12/21  01:10    


 


Urine Ketones  Neg mg/dL (Negative)   02/12/21  01:10    


 


Urine Blood  Sm  (Negative)   02/12/21  01:10    


 


Urine Nitrite  Neg  (Negative)   02/12/21  01:10    


 


Urine Bilirubin  Neg  (Negative)   02/12/21  01:10    


 


Urine Urobilinogen  < 2.0 mg/dL (<2.0)   02/12/21  01:10    


 


Ur Leukocyte Esterase  Neg  (Negative)   02/12/21  01:10    


 


Urine WBC (Auto)  4.0 /HPF (0.0-6.0)   02/12/21  01:10    


 


Urine RBC (Auto)  15.0 /HPF (0.0-6.0)   02/12/21  01:10    


 


U Epithel Cells (Auto)  < 1.0 /HPF (0-13.0)   02/12/21  01:10    


 


Hyaline Casts  1 /LPF  02/11/21  22:00    


 


Urine Mucus  Few /HPF  02/12/21  01:10    


 


Urine Eosinophils  Rare eosinophil seen  (None Seen)   02/12/21  15:47    


 


Urine Creatinine  45.6 mg/dL (0.1-20.0)  H  02/12/21  15:38    


 


Urine Creatinine  46.0 mg/dL (0.1-20.0)  H  02/12/21  15:38    


 


Protein/Creatinin Ratio  5.04   02/12/21  15:38    


 


Urine Sodium  104 mmol/L  02/12/21  15:38    


 


Urine Total Protein  230 mg/dL (5-11.8)  H  02/12/21  15:38    


 


Urine HCG, Qual  Negative  (Negative)   02/11/21  22:00    


 


Hep Bs Antigen  Non-reactive  (Negative)   02/12/21  14:10    


 


Hepatitis C Antibody  Non-reactive  (NonReactive)   02/12/21  14:10    


 


HIV 1&2 Antibody Rapid  Non react  (Non React)   02/12/21  10:15    


 


HIV P24 Antigen  Non react  (Non React)   02/12/21  10:15    


 


Schistocytes Smear  Rare   02/12/21  10:15    











Microbiology: 


Microbiology





02/12/21 14:10   Peripheral/Venous   Blood Culture - Preliminary


                            Culture in Progress


02/12/21 14:24   Peripheral/Venous   Blood Culture - Preliminary


                            Culture in Progress











- Diagnostic Impressions


Diagnostic Impressions: 








Echocardiogram  02/12/21 01:20


Transthoracic Echocardiogram


 


Indication:


Chest pain


BP:           163/113


HR:           89


 


Conclusions


 *Unremarkable echo-doppler study.


 


Findings     


Left Ventricle:


The left ventricular chamber size is normal. Global left ventricular


wall motion and contractility are within normal limits. Global left


ventricular systolic function is normal. The estimated ejection fraction


is 55-60%.  Normal left ventricular diastolic filling is observed. 


 


Left Atrium:


The left atrial chamber size is normal. 


 


Right Ventricle:


The right ventricular cavity size is normal. 


 


Right Atrium:


The right atrial cavity size is normal. 


 


Aortic Valve:


The aortic valve structure is normal. There is no evidence of aortic


regurgitation. 


 


Mitral Valve:


The mitral valve leaflets are mildly thickened. There is no evidence of


mitral regurgitation. 


 


Tricuspid Valve:


The tricuspid valve leaflets are normal.  There is mild tricuspid


regurgitation. The right ventricular systolic pressure is calculated at


28 mmHg.  


 


Pulmonic Valve:


The pulmonic valve appears normal. There is trace pulmonic


regurgitation.  


 


Pericardium:


There is no pericardial effusion. 


 


Aorta:


The aorta appears normal.  


 


Venous:


The inferior vena cava appears normal. 


 


Measurements     


Chambers 2D


Name                    Value         Normal Range            


IVSd (2D)               0.96 cm       (0.6 - 1.1)             


LVPWd (2D)              0.9 cm        (0.6 - 1.1)             


LVIDd (2D)              4.59 cm       (3.7 - 5.6)             


LVIDs (2D)              2.99 cm       (2 - 3.8)               


LV FS (2D)              34.92 %       -                        


EF Teichholz (2D)       64.24 %       -                        


Ao root diameter (2D)   2.62 cm       (2 - 3.7)               


 


Volumes/Mass


Name                    Value         Normal Range            


LA ESV SP 4CH (A/L)     14.15 ml      -                        


LA ESV SP 2CH (A/L)     23.9 ml       -                        


LA ESV BP (A/L)         18.76 ml      -                        


LA ESV BP (A/L) index   12.03 ml/m2   -                        


LA ESV SP 4CH (MOD)     12.91 ml      -                        


LA ESV SP 2CH (MOD)     24.49 ml      -                        


LA ESV BP (MOD)         17.61 ml      -                        


LA ESV BP (MOD) index   11.29 ml/m2   -                        


 


Diastolic/Systolic Function


Name                    Value         Normal Range            


MV E-wave Vmax          0.88 m/sec    -                        


MV deceleration time    164.26 msec   -                        


MV A-wave Vmax          0.75 m/sec    -                        


MV E:A ratio            1.18 ratio    -                        


 


Aortic Valve


Name                    Value         Normal Range            


AV Vmax                 1.23 m/sec    -                        


AV VTI                  22.05 cm      -                        


AV peak gradient        6.04 mmHg     -                        


AV mean gradient        3.49 mmHg     -                        


LVOT diameter           2.05 cm       -                        


LVOT Vmax               1.01 m/sec    -                        


LVOT VTI                17.84 cm      -                        


LVOT peak gradient      4.1 mmHg      -                        


LVOT mean gradient      2.15 mmHg     -                        


SV LVOT                 59.03 ml      -                        


JONATHAN (continuity Vmax)   2.73 cm2      -                        


JONATHAN (continuity VTI)    2.68 cm2      -                        


Ascending Ao            2.64 cm       -                        


 


Tricuspid Valve


Name                    Value         Normal Range            


TR Vmax                 2.51 m/sec    -                        


TR peak gradient        25 mmHg       -                        


RAP                     3 mmHg        -                        


RVSP                    28 mmHg       -                        


IVC diameter            2.07 cm       (1.2 - 2.3)             


 


Pulmonic Valve/Qp:Qs


Name                    Value         Normal Range            


PV Vmax                 0.9 m/sec     -                        


PV peak gradient        3.24 mmHg     -                        


SC end-diastolic Vmax   0.92 m/sec    -                        


PV acceleration time    91.34 msec    -                        


 


Electronically signed by: Cayetano Gonzalez MD on 02/12/2021


10:32:02











Banks/IV: 


                                        





Voiding Method                   Toilet











Active Medications





- Current Medications


Current Medications: 














Generic Name Dose Route Start Last Admin





  Trade Name Freq  PRN Reason Stop Dose Admin


 


Acetaminophen  650 mg  02/12/21 01:07  02/13/21 04:30





  Acetaminophen 325 Mg Tab  PO   650 mg





  Q4H PRN   Administration





  Pain MILD(1-3)/Fever >100.5/HA  


 


Amlodipine Besylate  10 mg  02/12/21 12:00  02/12/21 12:32





  Amlodipine 10 Mg Tab  PO   10 mg





  QDAY KETTY   Administration


 


Atorvastatin Calcium  20 mg  02/12/21 22:00  02/12/21 21:29





  Atorvastatin 20 Mg Tab  PO   20 mg





  QHS KETTY   Administration


 


Calcium Carbonate/Glycine  1,250 mg  02/12/21 12:00  02/12/21 21:29





  Calcium Carbonate 1250 Mg Tab  PO   1,250 mg





  BID KETTY   Administration


 


Famotidine  10 mg  02/12/21 10:00  02/12/21 21:29





  Famotidine 10 Mg Tab  PO   10 mg





  BID KETTY   Administration


 


Fluticasone Propionate  50 mcg  02/12/21 10:00  02/12/21 18:50





  Fluticasone Propionate Nasal Spray 16 Gm  NS   50 mcg





  QDAY KETTY   Administration


 


Heparin Sodium (Porcine)  5,000 unit  02/12/21 06:00  02/13/21 06:10





  Heparin 5,000 Unit/1 Ml Vial  SUB-Q   5,000 unit





  Q8HR KETTY   Administration


 


Hydralazine HCl  10 mg  02/12/21 05:12  02/13/21 04:30





  Hydralazine 20 Mg/1 Ml Inj  IV   10 mg





  Q6HR PRN   Administration





  Blood Pressure  


 


Sodium Chloride  1,000 mls @ 100 mls/hr  02/12/21 02:00  02/12/21 02:16





  Nacl 0.45% 1000 Ml  IV   100 mls/hr





  AS DIRECT KETTY   Administration


 


Piperacillin Sod/Tazobactam Sod  3.375 gm in 50 mls @ 100 mls/hr  02/13/21 08:00







  Zosyn/Ns 3.375gm/50ml  IV  





  Q8H [facility]  





  Protocol  


 


Morphine Sulfate  2 mg  02/12/21 01:07  02/12/21 18:37





  Morphine 2 Mg/1 Ml Inj  IV   2 mg





  Q4H PRN   Administration





  Pain, Moderate (4-6)  


 


Ondansetron HCl  4 mg  02/12/21 01:07  02/12/21 15:29





  Ondansetron 4 Mg/2 Ml Inj  IV   4 mg





  Q8H PRN   Administration





  Nausea And Vomiting  


 


Sodium Chloride  10 ml  02/12/21 10:00  02/12/21 21:30





  Sodium Chloride 0.9% 10 Ml Flush Syringe  IV   10 ml





  BID KETTY   Administration


 


Sodium Chloride  10 ml  02/12/21 01:07  02/13/21 04:30





  Sodium Chloride 0.9% 10 Ml Flush Syringe  IV   10 ml





  PRN PRN   Administration





  LINE FLUSH

## 2021-02-14 LAB
BASOPHILS # (AUTO): 0 K/MM3 (ref 0–0.1)
BASOPHILS NFR BLD AUTO: 0.2 % (ref 0–1.8)
BUN SERPL-MCNC: 48 MG/DL (ref 7–17)
BUN/CREAT SERPL: 7 %
CALCIUM SERPL-MCNC: 8.3 MG/DL (ref 8.4–10.2)
EOSINOPHIL # BLD AUTO: 0 K/MM3 (ref 0–0.4)
EOSINOPHIL NFR BLD AUTO: 0.1 % (ref 0–4.3)
HCT VFR BLD CALC: 24.5 % (ref 30.3–42.9)
HEMOLYSIS INDEX: 0
HGB BLD-MCNC: 8 GM/DL (ref 10.1–14.3)
LYMPHOCYTES # BLD AUTO: 1.1 K/MM3 (ref 1.2–5.4)
LYMPHOCYTES NFR BLD AUTO: 7.3 % (ref 13.4–35)
MCHC RBC AUTO-ENTMCNC: 33 % (ref 30–34)
MCV RBC AUTO: 82 FL (ref 79–97)
MONOCYTES # (AUTO): 1.2 K/MM3 (ref 0–0.8)
MONOCYTES % (AUTO): 7.7 % (ref 0–7.3)
PLATELET # BLD: 93 K/MM3 (ref 140–440)
RBC # BLD AUTO: 2.99 M/MM3 (ref 3.65–5.03)

## 2021-02-14 RX ADMIN — CALCIUM SCH MG: 500 TABLET ORAL at 12:05

## 2021-02-14 RX ADMIN — FLUTICASONE PROPIONATE SCH MCG: 50 SPRAY, METERED NASAL at 12:05

## 2021-02-14 RX ADMIN — HEPARIN SODIUM SCH UNIT: 5000 INJECTION, SOLUTION INTRAVENOUS; SUBCUTANEOUS at 06:25

## 2021-02-14 RX ADMIN — FAMOTIDINE SCH MG: 10 TABLET ORAL at 12:05

## 2021-02-14 RX ADMIN — HYDROXYCHLOROQUINE SULFATE SCH MG: 200 TABLET ORAL at 12:05

## 2021-02-14 RX ADMIN — HEPARIN SODIUM SCH UNIT: 5000 INJECTION, SOLUTION INTRAVENOUS; SUBCUTANEOUS at 16:46

## 2021-02-14 RX ADMIN — HEPARIN SODIUM SCH UNIT: 5000 INJECTION, SOLUTION INTRAVENOUS; SUBCUTANEOUS at 22:20

## 2021-02-14 RX ADMIN — PIPERACILLIN SODIUM AND TAZOBACTAM SODIUM SCH MLS/HR: 2; .25 INJECTION, POWDER, LYOPHILIZED, FOR SOLUTION INTRAVENOUS at 01:49

## 2021-02-14 RX ADMIN — CALCIUM SCH MG: 500 TABLET ORAL at 22:20

## 2021-02-14 RX ADMIN — METHYLPREDNISOLONE SODIUM SUCCINATE SCH MLS/HR: 500 INJECTION, POWDER, FOR SOLUTION INTRAMUSCULAR; INTRAVENOUS at 22:20

## 2021-02-14 RX ADMIN — FAMOTIDINE SCH MG: 10 TABLET ORAL at 22:20

## 2021-02-14 RX ADMIN — Medication SCH ML: at 22:21

## 2021-02-14 NOTE — PROGRESS NOTE
Assessment and Plan


(1) severe renal failure


(2) HTN


(3) Chest pain


(4) Dysuria


(5) Headache








-unknown baseline, likely advanced CKD secondary to HTN and possible LN


-she is agreeable to a kidney biopsy, Risks explained including risk of bleeding

, her Renal US shows CKD signs and she has nephrotic range proteinuria. Will 

consider Kidney biopsy once infection ruled out, blood Cx/Urine show NGTN. Covid

19 test -ve.


-Pt says she will decide about dialysis after kidney bx, Cr is worsening, no 

acute RRT indication today.


-on admission she had low temp with leukocytosis and started on abx, due to 

possible ongoing infection and unclear history for lupus, holding using pulse -

steroids for now, follow Blood Cx.


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


-Ordered C3/C4.


-On IVFs








Subjective


Date of service: 02/14/21


Interval history: 


Making urine.





Objective





- Exam


Narrative Exam: 


General appearance: well-developed, well-nourished, appears stated age


EENT: ATNC, PERRL, mucous membranes moist


Neck: Present: neck supple


Respiratory: Clear to Ascultation


Heart: regular, S1S2


Gastrointestinal: Present: normoactive bowel sounds.  Absent: tenderness, 

distended


Integumentary: no rash, warm and dry


Neurologic: no focal deficit, no asterixis, alert and oriented x3


Musculoskeletal: Present: other (no edema in BLE)


Psychiatric: mood/affect appropriate, cooperative





- Vital Signs


Vital signs: 


                               Vital Signs - 12hr











  02/13/21 02/14/21 02/14/21





  23:33 02:00 05:13


 


Temperature 98.8 F  98.5 F


 


Pulse Rate 94 H 86 94 H


 


Respiratory 16  20





Rate   


 


Blood Pressure 131/89  154/89


 


O2 Sat by Pulse 99  100





Oximetry   














  02/14/21





  08:23


 


Temperature 99.1 F


 


Pulse Rate 98 H


 


Respiratory 16





Rate 


 


Blood Pressure 117/81


 


O2 Sat by Pulse 100





Oximetry 














- Lab





                                 02/14/21 04:19





                                 02/14/21 04:19


                             Most recent lab results











Calcium  8.3 mg/dL (8.4-10.2)  L  02/14/21  04:19    


 


Phosphorus  4.30 mg/dL (2.5-4.5)  D 02/14/21  04:19    


 


Urine Creatinine  45.6 mg/dL (0.1-20.0)  H  02/12/21  15:38    


 


Urine Creatinine  46.0 mg/dL (0.1-20.0)  H  02/12/21  15:38    


 


Urine Sodium  104 mmol/L  02/12/21  15:38    


 


Urine Total Protein  230 mg/dL (5-11.8)  H  02/12/21  15:38    














Medications & Allergies





- Medications


Allergies/Adverse Reactions: 


                                    Allergies





No Known Allergies Allergy (Verified 03/29/20 20:14)


   








Home Medications: 


                                Home Medications











 Medication  Instructions  Recorded  Confirmed  Last Taken  Type


 


Fluconazole (Nf) [Diflucan TAB] 150 mg PO ONCE #2 tablet 07/10/19  Unknown Rx


 


metroNIDAZOLE [Flagyl TAB] 500 mg PO Q12HR #14 tab 12/13/19  Unknown Rx


 


Cetirizine HCl [ZyrTEC] 10 mg PO QAM 14 Days #14 capsule 03/29/20  Unknown Rx


 


Fluticasone [Flonase] 1 spray NS QDAY 14 Days #1 bottle 03/29/20  Unknown Rx











Active Medications: 














Generic Name Dose Route Start Last Admin





  Trade Name Freq  PRN Reason Stop Dose Admin


 


Acetaminophen  650 mg  02/12/21 01:07  02/13/21 04:30





  Acetaminophen 325 Mg Tab  PO   650 mg





  Q4H PRN   Administration





  Pain MILD(1-3)/Fever >100.5/HA  


 


Amlodipine Besylate  10 mg  02/12/21 12:00  02/13/21 10:52





  Amlodipine 10 Mg Tab  PO   10 mg





  QDAY KETTY   Administration


 


Atorvastatin Calcium  20 mg  02/12/21 22:00  02/13/21 21:50





  Atorvastatin 20 Mg Tab  PO   20 mg





  QHS KETTY   Administration


 


Calcium Carbonate/Glycine  1,250 mg  02/12/21 12:00  02/13/21 21:49





  Calcium Carbonate 1250 Mg Tab  PO   1,250 mg





  BID KETTY   Administration


 


Famotidine  10 mg  02/12/21 10:00  02/13/21 21:50





  Famotidine 10 Mg Tab  PO   10 mg





  BID KETTY   Administration


 


Fluticasone Propionate  50 mcg  02/12/21 10:00  02/13/21 10:38





  Fluticasone Propionate Nasal Spray 16 Gm  NS   50 mcg





  QDAY KETTY   Administration


 


Heparin Sodium (Porcine)  5,000 unit  02/12/21 06:00  02/14/21 06:25





  Heparin 5,000 Unit/1 Ml Vial  SUB-Q   5,000 unit





  Q8HR KETTY   Administration


 


Hydralazine HCl  10 mg  02/12/21 05:12  02/13/21 04:30





  Hydralazine 20 Mg/1 Ml Inj  IV   10 mg





  Q6HR PRN   Administration





  Blood Pressure  


 


Hydroxychloroquine Sulfate  200 mg  02/13/21 10:00  02/13/21 10:38





  Hydroxychloroquine 200 Mg Tab  PO   200 mg





  QDAY KETTY   Administration


 


Morphine Sulfate  2 mg  02/12/21 01:07  02/12/21 18:37





  Morphine 2 Mg/1 Ml Inj  IV   2 mg





  Q4H PRN   Administration





  Pain, Moderate (4-6)  


 


Ondansetron HCl  4 mg  02/12/21 01:07  02/12/21 15:29





  Ondansetron 4 Mg/2 Ml Inj  IV   4 mg





  Q8H PRN   Administration





  Nausea And Vomiting  


 


Prednisone  10 mg  02/13/21 10:00  02/13/21 10:38





  Prednisone 10 Mg Tab  PO   10 mg





  QDAY KETTY   Administration


 


Sodium Chloride  10 ml  02/12/21 10:00  02/13/21 21:50





  Sodium Chloride 0.9% 10 Ml Flush Syringe  IV   10 ml





  BID KETTY   Administration


 


Sodium Chloride  10 ml  02/12/21 01:07  02/13/21 04:30





  Sodium Chloride 0.9% 10 Ml Flush Syringe  IV   10 ml





  PRN PRN   Administration





  LINE FLUSH  


 


Sodium Polystyrene Sulfonate  15 gm  02/14/21 10:07 





  Sodium Polystyrene 15 Gm/60 Ml Oral Liqd  PO  02/14/21 10:08 





  ONCE ONE

## 2021-02-14 NOTE — PROGRESS NOTE
Assessment and Plan


Assessment and plan: 





(1) Acute kidney failure, lupus nephritis


Current Visit: Yes   Status: Acute   


Qualifiers: 


   Acute renal failure type: unspecified   Qualified Code(s): N17.9 - Acute 

kidney failure, unspecified   


Plan to address problem: 


Admit the patient to the medical floor.  Put the patient on cardiac diet.  IV 

fluid half-normal saline at the rate of 100 cc/h.  Avoid nephrotoxic drug.  We 

will consult nephrology for further evaluation and treatment.  Renal ultrasound.

 Recheck CBC BMP in the morning.  Heparin 5000 units subcu every 8 hours for DVT

prophylaxis and Pepcid 20 mg p.o. twice daily for GI prophylaxis








(2) Back pain


Current Visit: Yes   Status: Acute   


Qualifiers: 


   Back pain location: low back pain   Chronicity: acute   Back pain laterality:

bilateral   Sciatica presence: without sciatica   Qualified Code(s): M54.5 - Low

back pain   





(3) Chest pain


Current Visit: Yes   Status: Acute   


Qualifiers: 


   Chest pain type: unspecified   Qualified Code(s): R07.9 - Chest pain, 

unspecified   


Plan to address problem: 


We put the patient on aspirin 81 mg p.o. daily and Lipitor 40 mg p.o. daily.  

Nitroglycerin as needed.  We will do the serial troponin.  We also order an 

echocardiogram.  Heparin 5000 units subcu every 8 hours.  Will consult 

cardiology if needed








(4) sepsis


Current Visit: Yes   Status: Acute   


Plan to address problem: 


We seen the urine for analysis and culture.  Rocephin 1 g IV daily.  Patient is 

also on Diflucan.








(5) Headache


Current Visit: Yes   Status: Acute   


Qualifiers: 


   Headache type: unspecified   Headache chronicity pattern: acute headache   

Intractability: not intractable   Qualified Code(s): R51.9 - Headache, 

unspecified   


Plan to address problem: 


Tylenol 650 p.o. every 6 hours as needed.  Morphine 1 to 2 mg IV every 4 hours 

as needed.











2/13/2021


-Patient is septic evidenced by leukocytosis and fever.  Patient is on IV Zosyn.

 UA is negative


-Patient has acute renal failure versus acute on chronic renal failure, with GFR

of 10, nephrology is following.  Patient has lupus and likely due to lupus 

nephritis


-Pain is likely due to lupus, I placed the patient on steroids and 

hydroxychloroquine


-Pain control





2/13/2021


-Patient is still leukocytosis [but trending down] but no fever over the last 24

hours.  Blood and urine cultures are negative.  Fever is likely due to lupus.  I

will stop the IV antibiotics


-Patient has acute renal failure and creatinine is worsening overnight increased

to 7.3.  Nephrology is following and planning kidney biopsy and possible 

dialysis.


-I put the patient on hydroxychloroquine and prednisone.





History


Interval history: 





Patient was seen and evaluated this morning


Patient said pain is getting better





Hospitalist Physical





- Physical exam


Narrative exam: 





 Not in cardiopulmonary distress. 


 The patient appeared well nourished and normally developed.


 Vital signs as documented.


 Head exam is unremarkable.


 No scleral icterus .


 Neck is without jugular venous distension, thyromegaly, or carotid bruits. 


 Lungs are clear to auscultation.


Cardiac exam reveals regular rate and  Rhythm. 


Abdominal exam reveals normal bowel sounds, nontender, no organomegaly.


Extremities are nonedematous and both femoral and pedal pulses are normal.


CNS: Alert and oriented 3.  No focal weakness.





- Constitutional


Vitals: 


                                        











Temp Pulse Resp BP Pulse Ox


 


 99.1 F   98 H  16   117/81   100 


 


 02/14/21 08:23  02/14/21 08:23  02/14/21 08:23  02/14/21 08:23  02/14/21 08:23











General appearance: Present: no acute distress, well-nourished





HEART Score





- HEART Score


EKG: Normal


Age: < 45


Risk factors: No known risk factors


Troponin: 


                                        











Troponin T  < 0.010 ng/mL (0.00-0.029)   02/12/21  01:08    











Troponin: < normal limit





Results





- Labs


CBC & Chem 7: 


                                 02/14/21 04:19





                                 02/14/21 04:19


Labs: 


                             Laboratory Last Values











WBC  14.9 K/mm3 (4.5-11.0)  H  02/14/21  04:19    


 


RBC  2.99 M/mm3 (3.65-5.03)  L  02/14/21  04:19    


 


Hgb  8.0 gm/dl (10.1-14.3)  L  02/14/21  04:19    


 


Hct  24.5 % (30.3-42.9)  L  02/14/21  04:19    


 


MCV  82 fl (79-97)   02/14/21  04:19    


 


MCH  27 pg (28-32)  L  02/14/21  04:19    


 


MCHC  33 % (30-34)   02/14/21  04:19    


 


RDW  14.2 % (13.2-15.2)   02/14/21  04:19    


 


Plt Count  93 K/mm3 (140-440)  L  02/14/21  04:19    


 


Lymph % (Auto)  7.3 % (13.4-35.0)  L  02/14/21  04:19    


 


Mono % (Auto)  7.7 % (0.0-7.3)  H  02/14/21  04:19    


 


Eos % (Auto)  0.1 % (0.0-4.3)   02/14/21  04:19    


 


Baso % (Auto)  0.2 % (0.0-1.8)   02/14/21  04:19    


 


Lymph # (Auto)  1.1 K/mm3 (1.2-5.4)  L  02/14/21  04:19    


 


Mono # (Auto)  1.2 K/mm3 (0.0-0.8)  H  02/14/21  04:19    


 


Eos # (Auto)  0.0 K/mm3 (0.0-0.4)   02/14/21  04:19    


 


Baso # (Auto)  0.0 K/mm3 (0.0-0.1)   02/14/21  04:19    


 


Add Manual Diff  Complete   02/12/21  10:15    


 


Total Counted  100   02/12/21  10:15    


 


Seg Neutrophils %  84.7 % (40.0-70.0)  H  02/14/21  04:19    


 


Seg Neuts % (Manual)  97.0 % (40.0-70.0)  H  02/12/21  10:15    


 


Lymphocytes % (Manual)  2.0 % (13.4-35.0)  L  02/12/21  10:15    


 


Monocytes % (Manual)  1.0 % (0.0-7.3)   02/12/21  10:15    


 


Nucleated RBC %  Not Reportable   02/12/21  10:15    


 


Seg Neutrophils #  12.6 K/mm3 (1.8-7.7)  H  02/14/21  04:19    


 


Seg Neutrophils # Man  16.3 K/mm3 (1.8-7.7)  H  02/12/21  10:15    


 


Band Neutrophils #  0.0 K/mm3  02/12/21  10:15    


 


Lymphocytes # (Manual)  0.3 K/mm3 (1.2-5.4)  L  02/12/21  10:15    


 


Abs React Lymphs (Man)  0.0 K/mm3  02/12/21  10:15    


 


Monocytes # (Manual)  0.2 K/mm3 (0.0-0.8)   02/12/21  10:15    


 


Eosinophils # (Manual)  0.0 K/mm3 (0.0-0.4)   02/12/21  10:15    


 


Basophils # (Manual)  0.0 K/mm3 (0.0-0.1)   02/12/21  10:15    


 


Metamyelocytes #  0.0 K/mm3  02/12/21  10:15    


 


Myelocytes #  0.0 K/mm3  02/12/21  10:15    


 


Promyelocytes #  0.0 K/mm3  02/12/21  10:15    


 


Blast Cells #  0.0 K/mm3  02/12/21  10:15    


 


WBC Morphology  Not Reportable   02/12/21  10:15    


 


Hypersegmented Neuts  Not Reportable   02/12/21  10:15    


 


Hyposegmented Neuts  Not Reportable   02/12/21  10:15    


 


Hypogranular Neuts  Not Reportable   02/12/21  10:15    


 


Smudge Cells  Not Reportable   02/12/21  10:15    


 


Toxic Granulation  Not Reportable   02/12/21  10:15    


 


Toxic Vacuolation  Not Reportable   02/12/21  10:15    


 


Dohle Bodies  Not Reportable   02/12/21  10:15    


 


Pelger-Huet Anomaly  Not Reportable   02/12/21  10:15    


 


Wendy Rods  Not Reportable   02/12/21  10:15    


 


Platelet Estimate  Consistent w auto   02/12/21  10:15    


 


Clumped Platelets  Not Reportable   02/12/21  10:15    


 


Plt Clumps, EDTA  Not Reportable   02/12/21  10:15    


 


Large Platelets  Not Reportable   02/12/21  10:15    


 


Giant Platelets  Rare   02/12/21  10:15    


 


Platelet Satelliting  Not Reportable   02/12/21  10:15    


 


Plt Morphology Comment  Not Reportable   02/12/21  10:15    


 


RBC Morphology  Normal   02/12/21  10:15    


 


Dimorphic RBCs  Not Reportable   02/12/21  10:15    


 


Polychromasia  Not Reportable   02/12/21  10:15    


 


Hypochromasia  Not Reportable   02/12/21  10:15    


 


Poikilocytosis  Not Reportable   02/12/21  10:15    


 


Anisocytosis  Not Reportable   02/12/21  10:15    


 


Microcytosis  Not Reportable   02/12/21  10:15    


 


Macrocytosis  Not Reportable   02/12/21  10:15    


 


Spherocytes  Not Reportable   02/12/21  10:15    


 


Pappenheimer Bodies  Not Reportable   02/12/21  10:15    


 


Sickle Cells  Not Reportable   02/12/21  10:15    


 


Target Cells  Not Reportable   02/12/21  10:15    


 


Tear Drop Cells  Not Reportable   02/12/21  10:15    


 


Ovalocytes  Not Reportable   02/12/21  10:15    


 


Helmet Cells  Not Reportable   02/12/21  10:15    


 


Chopra-Cokesbury Bodies  Not Reportable   02/12/21  10:15    


 


Cabot Rings  Not Reportable   02/12/21  10:15    


 


Iker Cells  Not Reportable   02/12/21  10:15    


 


Bite Cells  Not Reportable   02/12/21  10:15    


 


Crenated Cell  Not Reportable   02/12/21  10:15    


 


Elliptocytes  Not Reportable   02/12/21  10:15    


 


Acanthocytes (Spur)  Not Reportable   02/12/21  10:15    


 


Rouleaux  Not Reportable   02/12/21  10:15    


 


Hemoglobin C Crystals  Not Reportable   02/12/21  10:15    


 


Schistocytes  Rare   02/12/21  10:15    


 


Malaria parasites  Not Reportable   02/12/21  10:15    


 


Kurtis Bodies  Not Reportable   02/12/21  10:15    


 


Hem Pathologist Commnt  No   02/12/21  10:15    


 


PT  15.1 Sec. (12.2-14.9)  H  02/13/21  09:28    


 


INR  1.21  (0.87-1.13)  H  02/13/21  09:28    


 


APTT  50.4 Sec. (24.2-36.6)  H  02/13/21  09:28    


 


Sodium  137 mmol/L (137-145)   02/14/21  04:19    


 


Potassium  4.8 mmol/L (3.6-5.0)  D 02/14/21  04:19    


 


Chloride  103.9 mmol/L ()   02/14/21  04:19    


 


Carbon Dioxide  20 mmol/L (22-30)  L  02/14/21  04:19    


 


Anion Gap  18 mmol/L  02/14/21  04:19    


 


BUN  48 mg/dL (7-17)  H  02/14/21  04:19    


 


Creatinine  7.3 mg/dL (0.6-1.2)  H  02/14/21  04:19    


 


Estimated GFR  8 ml/min  02/14/21  04:19    


 


BUN/Creatinine Ratio  7 %  02/14/21  04:19    


 


Glucose  93 mg/dL ()   02/14/21  04:19    


 


Uric Acid  7.1 mg/dL (3.5-7.6)   02/12/21  14:10    


 


Calcium  8.3 mg/dL (8.4-10.2)  L  02/14/21  04:19    


 


Phosphorus  4.30 mg/dL (2.5-4.5)  D 02/14/21  04:19    


 


Iron  19 ug/dL ()  L  02/13/21  04:40    


 


TIBC  176 mcg/dL (250-450)  L  02/13/21  04:40    


 


Ferritin  130.0 ng/mL (10.0-200.0)   02/13/21  04:40    


 


Lactate Dehydrogenase  296 units/L ()  H  02/12/21  10:15    


 


Total Creatine Kinase  152 units/L ()  H  02/12/21  10:15    


 


Troponin T  < 0.010 ng/mL (0.00-0.029)   02/12/21  01:08    


 


Lipase  71 units/L (13-60)  H  02/11/21  23:09    


 


PTH Intact  359.1 pg/mL (15-65)  H  02/13/21  16:10    


 


Urine Color  Colorless  (Yellow)   02/12/21  01:10    


 


Urine Turbidity  Clear  (Clear)   02/12/21  01:10    


 


Urine pH  7.0  (5.0-7.0)   02/12/21  01:10    


 


Ur Specific Gravity  1.007  (1.003-1.030)   02/12/21  01:10    


 


Urine Protein  >500 mg/dL (Negative)   02/12/21  01:10    


 


Urine Glucose (UA)  50 mg/dL (Negative)   02/12/21  01:10    


 


Urine Ketones  Neg mg/dL (Negative)   02/12/21  01:10    


 


Urine Blood  Sm  (Negative)   02/12/21  01:10    


 


Urine Nitrite  Neg  (Negative)   02/12/21  01:10    


 


Urine Bilirubin  Neg  (Negative)   02/12/21  01:10    


 


Urine Urobilinogen  < 2.0 mg/dL (<2.0)   02/12/21  01:10    


 


Ur Leukocyte Esterase  Neg  (Negative)   02/12/21  01:10    


 


Urine WBC (Auto)  4.0 /HPF (0.0-6.0)   02/12/21  01:10    


 


Urine RBC (Auto)  15.0 /HPF (0.0-6.0)   02/12/21  01:10    


 


U Epithel Cells (Auto)  < 1.0 /HPF (0-13.0)   02/12/21  01:10    


 


Hyaline Casts  1 /LPF  02/11/21  22:00    


 


Urine Mucus  Few /HPF  02/12/21  01:10    


 


Urine Eosinophils  Rare eosinophil seen  (None Seen)   02/12/21  15:47    


 


Urine Creatinine  45.6 mg/dL (0.1-20.0)  H  02/12/21  15:38    


 


Urine Creatinine  46.0 mg/dL (0.1-20.0)  H  02/12/21  15:38    


 


Protein/Creatinin Ratio  5.04   02/12/21  15:38    


 


Urine Sodium  104 mmol/L  02/12/21  15:38    


 


Urine Total Protein  230 mg/dL (5-11.8)  H  02/12/21  15:38    


 


Urine HCG, Qual  Negative  (Negative)   02/11/21  22:00    


 


Coronavirus (PCR)  Negative  (Negative)   02/13/21  10:15    


 


Hep Bs Antigen  Non-reactive  (Negative)   02/12/21  14:10    


 


Hepatitis C Antibody  Non-reactive  (NonReactive)   02/12/21  14:10    


 


HIV 1&2 Antibody Rapid  Non react  (Non React)   02/12/21  10:15    


 


HIV P24 Antigen  Non react  (Non React)   02/12/21  10:15    


 


Schistocytes Smear  Rare   02/12/21  10:15    











Microbiology: 


Microbiology





02/12/21 14:10   Peripheral/Venous   Blood Culture - Preliminary


                            NO GROWTH AFTER 24 HOURS


02/12/21 14:24   Peripheral/Venous   Blood Culture - Preliminary


                            NO GROWTH AFTER 24 HOURS


02/12/21 15:47   Urine,Clean Catch   Urine Culture - Preliminary


                            NO GROWTH AFTER 24 HOURS











- Diagnostic Impressions


Diagnostic Impressions: 








Echocardiogram  02/12/21 01:20


Transthoracic Echocardiogram


 


Indication:


Chest pain


BP:           163/113


HR:           89


 


Conclusions


 *Unremarkable echo-doppler study.


 


Findings     


Left Ventricle:


The left ventricular chamber size is normal. Global left ventricular


wall motion and contractility are within normal limits. Global left


ventricular systolic function is normal. The estimated ejection fraction


is 55-60%.  Normal left ventricular diastolic filling is observed. 


 


Left Atrium:


The left atrial chamber size is normal. 


 


Right Ventricle:


The right ventricular cavity size is normal. 


 


Right Atrium:


The right atrial cavity size is normal. 


 


Aortic Valve:


The aortic valve structure is normal. There is no evidence of aortic


regurgitation. 


 


Mitral Valve:


The mitral valve leaflets are mildly thickened. There is no evidence of


mitral regurgitation. 


 


Tricuspid Valve:


The tricuspid valve leaflets are normal.  There is mild tricuspid


regurgitation. The right ventricular systolic pressure is calculated at


28 mmHg.  


 


Pulmonic Valve:


The pulmonic valve appears normal. There is trace pulmonic


regurgitation.  


 


Pericardium:


There is no pericardial effusion. 


 


Aorta:


The aorta appears normal.  


 


Venous:


The inferior vena cava appears normal. 


 


Measurements     


Chambers 2D


Name                    Value         Normal Range            


IVSd (2D)               0.96 cm       (0.6 - 1.1)             


LVPWd (2D)              0.9 cm        (0.6 - 1.1)             


LVIDd (2D)              4.59 cm       (3.7 - 5.6)             


LVIDs (2D)              2.99 cm       (2 - 3.8)               


LV FS (2D)              34.92 %       -                        


EF Teichholz (2D)       64.24 %       -                        


Ao root diameter (2D)   2.62 cm       (2 - 3.7)               


 


Volumes/Mass


Name                    Value         Normal Range            


LA ESV SP 4CH (A/L)     14.15 ml      -                        


LA ESV SP 2CH (A/L)     23.9 ml       -                        


LA ESV BP (A/L)         18.76 ml      -                        


LA ESV BP (A/L) index   12.03 ml/m2   -                        


LA ESV SP 4CH (MOD)     12.91 ml      -                        


LA ESV SP 2CH (MOD)     24.49 ml      -                        


LA ESV BP (MOD)         17.61 ml      -                        


LA ESV BP (MOD) index   11.29 ml/m2   -                        


 


Diastolic/Systolic Function


Name                    Value         Normal Range            


MV E-wave Vmax          0.88 m/sec    -                        


MV deceleration time    164.26 msec   -                        


MV A-wave Vmax          0.75 m/sec    -                        


MV E:A ratio            1.18 ratio    -                        


 


Aortic Valve


Name                    Value         Normal Range            


AV Vmax                 1.23 m/sec    -                        


AV VTI                  22.05 cm      -                        


AV peak gradient        6.04 mmHg     -                        


AV mean gradient        3.49 mmHg     -                        


LVOT diameter           2.05 cm       -                        


LVOT Vmax               1.01 m/sec    -                        


LVOT VTI                17.84 cm      -                        


LVOT peak gradient      4.1 mmHg      -                        


LVOT mean gradient      2.15 mmHg     -                        


SV LVOT                 59.03 ml      -                        


JONATHAN (continuity Vmax)   2.73 cm2      -                        


JONATHAN (continuity VTI)    2.68 cm2      -                        


Ascending Ao            2.64 cm       -                        


 


Tricuspid Valve


Name                    Value         Normal Range            


TR Vmax                 2.51 m/sec    -                        


TR peak gradient        25 mmHg       -                        


RAP                     3 mmHg        -                        


RVSP                    28 mmHg       -                        


IVC diameter            2.07 cm       (1.2 - 2.3)             


 


Pulmonic Valve/Qp:Qs


Name                    Value         Normal Range            


PV Vmax                 0.9 m/sec     -                        


PV peak gradient        3.24 mmHg     -                        


NC end-diastolic Vmax   0.92 m/sec    -                        


PV acceleration time    91.34 msec    -                        


 


Electronically signed by: Cayetano Gonzalez MD on 02/12/2021


10:32:02











Banks/IV: 


                                        





Voiding Method                   Toilet











Active Medications





- Current Medications


Current Medications: 














Generic Name Dose Route Start Last Admin





  Trade Name Freq  PRN Reason Stop Dose Admin


 


Acetaminophen  650 mg  02/12/21 01:07  02/13/21 04:30





  Acetaminophen 325 Mg Tab  PO   650 mg





  Q4H PRN   Administration





  Pain MILD(1-3)/Fever >100.5/HA  


 


Amlodipine Besylate  10 mg  02/12/21 12:00  02/13/21 10:52





  Amlodipine 10 Mg Tab  PO   10 mg





  QDAY KETTY   Administration


 


Atorvastatin Calcium  20 mg  02/12/21 22:00  02/13/21 21:50





  Atorvastatin 20 Mg Tab  PO   20 mg





  QHS KETTY   Administration


 


Calcium Carbonate/Glycine  1,250 mg  02/12/21 12:00  02/13/21 21:49





  Calcium Carbonate 1250 Mg Tab  PO   1,250 mg





  BID KETTY   Administration


 


Famotidine  10 mg  02/12/21 10:00  02/13/21 21:50





  Famotidine 10 Mg Tab  PO   10 mg





  BID KETTY   Administration


 


Fluticasone Propionate  50 mcg  02/12/21 10:00  02/13/21 10:38





  Fluticasone Propionate Nasal Spray 16 Gm  NS   50 mcg





  QDAY KETTY   Administration


 


Heparin Sodium (Porcine)  5,000 unit  02/12/21 06:00  02/14/21 06:25





  Heparin 5,000 Unit/1 Ml Vial  SUB-Q   5,000 unit





  Q8HR KETTY   Administration


 


Hydralazine HCl  10 mg  02/12/21 05:12  02/13/21 04:30





  Hydralazine 20 Mg/1 Ml Inj  IV   10 mg





  Q6HR PRN   Administration





  Blood Pressure  


 


Hydroxychloroquine Sulfate  200 mg  02/13/21 10:00  02/13/21 10:38





  Hydroxychloroquine 200 Mg Tab  PO   200 mg





  QDAY KETTY   Administration


 


Piperacillin Sod/Tazobactam Sod  2.25 gm in 50 mls @ 100 mls/hr  02/13/21 09:00 

02/14/21 02:59





  Zosyn/Ns 2.25 Gm/50ml  IV   Infused





  Q8H KETTY   Infusion


 


Morphine Sulfate  2 mg  02/12/21 01:07  02/12/21 18:37





  Morphine 2 Mg/1 Ml Inj  IV   2 mg





  Q4H PRN   Administration





  Pain, Moderate (4-6)  


 


Ondansetron HCl  4 mg  02/12/21 01:07  02/12/21 15:29





  Ondansetron 4 Mg/2 Ml Inj  IV   4 mg





  Q8H PRN   Administration





  Nausea And Vomiting  


 


Prednisone  10 mg  02/13/21 10:00  02/13/21 10:38





  Prednisone 10 Mg Tab  PO   10 mg





  QDAY KETTY   Administration


 


Sodium Chloride  10 ml  02/12/21 10:00  02/13/21 21:50





  Sodium Chloride 0.9% 10 Ml Flush Syringe  IV   10 ml





  BID KETTY   Administration


 


Sodium Chloride  10 ml  02/12/21 01:07  02/13/21 04:30





  Sodium Chloride 0.9% 10 Ml Flush Syringe  IV   10 ml





  PRN PRN   Administration





  LINE FLUSH

## 2021-02-15 LAB
ANISOCYTOSIS BLD QL SMEAR: (no result)
BAND NEUTROPHILS # (MANUAL): 0 K/MM3
BUN SERPL-MCNC: 44 MG/DL (ref 7–17)
BUN/CREAT SERPL: 6 %
CALCIUM SERPL-MCNC: 8.6 MG/DL (ref 8.4–10.2)
HCT VFR BLD CALC: 29 % (ref 30.3–42.9)
HEMOLYSIS INDEX: 1
HGB BLD-MCNC: 9.5 GM/DL (ref 10.1–14.3)
MCHC RBC AUTO-ENTMCNC: 33 % (ref 30–34)
MCV RBC AUTO: 82 FL (ref 79–97)
MYELOCYTES # (MANUAL): 0 K/MM3
PLATELET # BLD: 129 K/MM3 (ref 140–440)
PROMYELOCYTES # (MANUAL): 0 K/MM3
RBC # BLD AUTO: 3.55 M/MM3 (ref 3.65–5.03)
TOTAL CELLS COUNTED BLD: 100

## 2021-02-15 RX ADMIN — Medication SCH ML: at 22:17

## 2021-02-15 RX ADMIN — HEPARIN SODIUM SCH UNIT: 5000 INJECTION, SOLUTION INTRAVENOUS; SUBCUTANEOUS at 05:22

## 2021-02-15 RX ADMIN — FAMOTIDINE SCH MG: 10 TABLET ORAL at 22:16

## 2021-02-15 RX ADMIN — FAMOTIDINE SCH MG: 10 TABLET ORAL at 13:14

## 2021-02-15 RX ADMIN — HEPARIN SODIUM SCH UNIT: 5000 INJECTION, SOLUTION INTRAVENOUS; SUBCUTANEOUS at 15:15

## 2021-02-15 RX ADMIN — FLUTICASONE PROPIONATE SCH MCG: 50 SPRAY, METERED NASAL at 13:50

## 2021-02-15 RX ADMIN — HEPARIN SODIUM SCH UNIT: 5000 INJECTION, SOLUTION INTRAVENOUS; SUBCUTANEOUS at 22:17

## 2021-02-15 RX ADMIN — Medication SCH ML: at 19:15

## 2021-02-15 RX ADMIN — CALCIUM SCH MG: 500 TABLET ORAL at 13:14

## 2021-02-15 RX ADMIN — MORPHINE SULFATE PRN MG: 2 INJECTION, SOLUTION INTRAMUSCULAR; INTRAVENOUS at 00:49

## 2021-02-15 RX ADMIN — CALCIUM SCH MG: 500 TABLET ORAL at 22:16

## 2021-02-15 RX ADMIN — METHYLPREDNISOLONE SODIUM SUCCINATE SCH MLS/HR: 500 INJECTION, POWDER, FOR SOLUTION INTRAMUSCULAR; INTRAVENOUS at 19:13

## 2021-02-15 RX ADMIN — HYDROXYCHLOROQUINE SULFATE SCH MG: 200 TABLET ORAL at 13:15

## 2021-02-15 NOTE — PROGRESS NOTE
Assessment and Plan





Assessment


Severe renal failure


Hypertension


Chest pain


Dysuria


Headache








Plan:


-Renal labs reviewed. Serum creatinine 7.4 today, yesterday's was 7.3


-Unknown baseline, likely advanced CKD secondary to HTN and possible Lupus 

Nephritis


-Renal US shows CKD signs and she has nephrotic range proteinuria. 


-Started on IV Methylprednisolone steroid yesterday


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


-C3/C4, ALISIA, ANCA, anti-GBM-pending


-HIV and Hep panel-negative


-Patient states she will decide about dialysis after kidney biopsy, Cr is 

worsening, no acute RRT indication today


-Renal biopsy wont be done until tomorrow 2/16/21 per CT staff


-Patient can resume renal diet for today


-Discussed extensively with patient the different types of dialysis modalities, 

risks and benefits of dialysis and medical complications that may arise if 

dialysis is not done to include Volume Overload, Uremia/Metabolic Encephalopathy

and Death. Discussed initial perm-cath placement, future permanent AVF placem

ent, how to care for perm-catheter at home to include no showering to keep 

catheter dry and patient asked about sexuality pertaining to dialysis which was 

discussed. Patient expressed concerned about having a perm-catheter placed in 

her chest as she is concerned about the appearance and not being able to swim 

this summer if it is in place. Reassurance and medical perspective provided once

more. We will follow-up with patient tomorrow to reassess her thoughts on 

starting hemodialysis and proceed with renal biopsy tomorrow as well.


-Continue to monitor renal function closely








Subjective


Date of service: 02/15/21


Interval history: 





Patient seen lying in bed. Spent an extensive amount of time answering patient's

questions regarding renal biopsy, perm-catheter placement and care, AVF 

placement, dialysis modalities and risks and benefits of dialysis and without 

dialysis.





Objective





- Vital Signs


Vital signs: 


                               Vital Signs - 12hr











  02/15/21





  04:26


 


Temperature 98.2 F


 


Pulse Rate 83


 


Respiratory 20





Rate 


 


Blood Pressure 127/85


 


O2 Sat by Pulse 98





Oximetry 














- General Appearance


General appearance: well-developed, appears stated age


EENT: ATNC, PERRL, hearing intact, vision intact


Neck: no JVD, supple


Respiratory: Present: Decreased Breath Sounds


Cardiology: S1S2


Gastrointestinal: normoactive bowel sounds


Integumentary: warm and dry


Neurologic: alert and oriented x3


Musculoskeletal: other (No edema)





- Lab





                                 02/15/21 05:21





                                 02/15/21 05:21


                             Most recent lab results











Calcium  8.6 mg/dL (8.4-10.2)   02/15/21  05:21    


 


Phosphorus  5.10 mg/dL (2.5-4.5)  H  02/15/21  05:21    


 


Urine Creatinine  45.6 mg/dL (0.1-20.0)  H  02/12/21  15:38    


 


Urine Creatinine  46.0 mg/dL (0.1-20.0)  H  02/12/21  15:38    


 


Urine Sodium  104 mmol/L  02/12/21  15:38    


 


Urine Total Protein  230 mg/dL (5-11.8)  H  02/12/21  15:38    














Medications & Allergies





- Medications


Allergies/Adverse Reactions: 


                                    Allergies





No Known Allergies Allergy (Verified 03/29/20 20:14)


   








Home Medications: 


                                Home Medications











 Medication  Instructions  Recorded  Confirmed  Last Taken  Type


 


Fluconazole (Nf) [Diflucan TAB] 150 mg PO ONCE #2 tablet 07/10/19  Unknown Rx


 


metroNIDAZOLE [Flagyl TAB] 500 mg PO Q12HR #14 tab 12/13/19  Unknown Rx


 


Cetirizine HCl [ZyrTEC] 10 mg PO QAM 14 Days #14 capsule 03/29/20  Unknown Rx


 


Fluticasone [Flonase] 1 spray NS QDAY 14 Days #1 bottle 03/29/20  Unknown Rx











Active Medications: 














Generic Name Dose Route Start Last Admin





  Trade Name Freq  PRN Reason Stop Dose Admin


 


Acetaminophen  650 mg  02/12/21 01:07  02/13/21 04:30





  Acetaminophen 325 Mg Tab  PO   650 mg





  Q4H PRN   Administration





  Pain MILD(1-3)/Fever >100.5/HA  


 


Amlodipine Besylate  10 mg  02/12/21 12:00  02/14/21 12:05





  Amlodipine 10 Mg Tab  PO   10 mg





  QDAY KETTY   Administration


 


Atorvastatin Calcium  20 mg  02/12/21 22:00  02/14/21 22:20





  Atorvastatin 20 Mg Tab  PO   20 mg





  QHS KETTY   Administration


 


Calcium Carbonate/Glycine  1,250 mg  02/12/21 12:00  02/14/21 22:20





  Calcium Carbonate 1250 Mg Tab  PO   1,250 mg





  BID KETTY   Administration


 


Famotidine  10 mg  02/12/21 10:00  02/14/21 22:20





  Famotidine 10 Mg Tab  PO   10 mg





  BID KETTY   Administration


 


Fluticasone Propionate  50 mcg  02/12/21 10:00  02/14/21 12:05





  Fluticasone Propionate Nasal Spray 16 Gm  NS   50 mcg





  QDAY KETTY   Administration


 


Heparin Sodium (Porcine)  5,000 unit  02/12/21 06:00  02/15/21 05:22





  Heparin 5,000 Unit/1 Ml Vial  SUB-Q   5,000 unit





  Q8HR KETTY   Administration


 


Hydralazine HCl  10 mg  02/12/21 05:12  02/13/21 04:30





  Hydralazine 20 Mg/1 Ml Inj  IV   10 mg





  Q6HR PRN   Administration





  Blood Pressure  


 


Hydroxychloroquine Sulfate  200 mg  02/13/21 10:00  02/14/21 12:05





  Hydroxychloroquine 200 Mg Tab  PO   200 mg





  QDAY KETTY   Administration


 


Methylprednisolone Sodium  100 mls @ 200 mls/hr  02/14/21 17:00  02/15/21 00:46





Succinate 1,000 mg/ Sodium  IV  02/16/21 10:29  Infused





Chloride  Q24HR KETTY   Infusion


 


Morphine Sulfate  2 mg  02/12/21 01:07  02/15/21 00:49





  Morphine 2 Mg/1 Ml Inj  IV   2 mg





  Q4H PRN   Administration





  Pain, Moderate (4-6)  


 


Ondansetron HCl  4 mg  02/12/21 01:07  02/12/21 15:29





  Ondansetron 4 Mg/2 Ml Inj  IV   4 mg





  Q8H PRN   Administration





  Nausea And Vomiting  


 


Sodium Chloride  10 ml  02/12/21 10:00  02/14/21 22:21





  Sodium Chloride 0.9% 10 Ml Flush Syringe  IV   10 ml





  BID KETTY   Administration


 


Sodium Chloride  10 ml  02/12/21 01:07  02/13/21 04:30





  Sodium Chloride 0.9% 10 Ml Flush Syringe  IV   10 ml





  PRN PRN   Administration





  LINE FLUSH

## 2021-02-15 NOTE — PROGRESS NOTE
Assessment and Plan


Assessment and plan: 





Acute kidney failure, lupus nephritis





Back pain





Chest pain





sepsis


Continue empiric Rocephin 1 g IV daily.  Patient is also on Diflucan.





Headache


Tylenol 650 p.o. every 6 hours as needed.  Morphine 1 to 2 mg IV every 4 hours 

as needed.











2/13/2021


-Patient is septic evidenced by leukocytosis and fever.  Patient is on IV Zosyn.

 UA is negative


-Patient has acute renal failure versus acute on chronic renal failure, with GFR

of 10, nephrology is following.  Patient has lupus and likely due to lupus 

nephritis


-Pain is likely due to lupus, I placed the patient on steroids and 

hydroxychloroquine


-Pain control





2/14/2021


-Patient is still leukocytosis [but trending down] but no fever over the last 24

hours.  Blood and urine cultures are negative.  Fever is likely due to lupus.  I

will stop the IV antibiotics


-Patient has acute renal failure and creatinine is worsening overnight increased

to 7.3.  Nephrology is following and planning kidney biopsy and possible 

dialysis.


-I put the patient on hydroxychloroquine and prednisone.





2/15/2021.  Patient with unknown baseline creatinine/renal function.  Patient 

likely has advanced CKD secondary to hypertension and possible lupus nephritis. 

 Renal US shows CKD signs and she has nephrotic range proteinuria.  Kidney 

biopsy per nephrology.  Pt says she will decide about dialysis after kidney bx, 

Cr is worsening.  Nephrology initiated pulse dosed steroids.  Hepatitis B and C-

, HIV negative.  Antibiotic discontinued.  Blood and urine cultures negative x48

hours.





History


Interval history: 





No new issues overnight.





Hospitalist Physical





- Constitutional


Vitals: 


                                        











Temp Pulse Resp BP Pulse Ox


 


 98.2 F   83   20   127/85   98 


 


 02/15/21 04:26  02/15/21 04:26  02/15/21 04:26  02/15/21 04:26  02/15/21 04:26











General appearance: Present: no acute distress, well-nourished





- EENT


Eyes: Present: PERRL, EOM intact


ENT: hearing intact, clear oral mucosa, dentition normal





- Neck


Neck: Present: supple, normal ROM





- Respiratory


Respiratory effort: normal


Respiratory: bilateral: CTA





- Cardiovascular


Rhythm: regular


Heart Sounds: Present: S1 & S2.  Absent: gallop, rub





- Extremities


Extremities: no ischemia, No edema, Full ROM





- Abdominal


General gastrointestinal: soft, non-tender, non-distended, normal bowel sounds





- Integumentary


Integumentary: Present: clear, warm, dry





- Neurologic


Neurologic: CNII-XII intact, moves all extremities





HEART Score





- HEART Score


EKG: Normal


Age: < 45


Risk factors: No known risk factors


Troponin: 


                                        











Troponin T  < 0.010 ng/mL (0.00-0.029)   02/12/21  01:08    











Troponin: < normal limit





Results





- Labs


CBC & Chem 7: 


                                 02/15/21 05:21





                                 02/15/21 05:21


Labs: 


                             Laboratory Last Values











WBC  11.9 K/mm3 (4.5-11.0)  H  02/15/21  05:21    


 


RBC  3.55 M/mm3 (3.65-5.03)  L  02/15/21  05:21    


 


Hgb  9.5 gm/dl (10.1-14.3)  L  02/15/21  05:21    


 


Hct  29.0 % (30.3-42.9)  L  02/15/21  05:21    


 


MCV  82 fl (79-97)   02/15/21  05:21    


 


MCH  27 pg (28-32)  L  02/15/21  05:21    


 


MCHC  33 % (30-34)   02/15/21  05:21    


 


RDW  14.2 % (13.2-15.2)   02/15/21  05:21    


 


Plt Count  129 K/mm3 (140-440)  L  02/15/21  05:21    


 


Lymph % (Auto)  7.3 % (13.4-35.0)  L  02/14/21  04:19    


 


Mono % (Auto)  7.7 % (0.0-7.3)  H  02/14/21  04:19    


 


Eos % (Auto)  0.1 % (0.0-4.3)   02/14/21  04:19    


 


Baso % (Auto)  0.2 % (0.0-1.8)   02/14/21  04:19    


 


Lymph # (Auto)  1.1 K/mm3 (1.2-5.4)  L  02/14/21  04:19    


 


Mono # (Auto)  1.2 K/mm3 (0.0-0.8)  H  02/14/21  04:19    


 


Eos # (Auto)  0.0 K/mm3 (0.0-0.4)   02/14/21  04:19    


 


Baso # (Auto)  0.0 K/mm3 (0.0-0.1)   02/14/21  04:19    


 


Add Manual Diff  Complete   02/12/21  10:15    


 


Total Counted  100   02/12/21  10:15    


 


Seg Neutrophils %  Np   02/15/21  05:21    


 


Seg Neuts % (Manual)  97.0 % (40.0-70.0)  H  02/12/21  10:15    


 


Lymphocytes % (Manual)  2.0 % (13.4-35.0)  L  02/12/21  10:15    


 


Monocytes % (Manual)  1.0 % (0.0-7.3)   02/12/21  10:15    


 


Nucleated RBC %  Not Reportable   02/12/21  10:15    


 


Seg Neutrophils #  12.6 K/mm3 (1.8-7.7)  H  02/14/21  04:19    


 


Seg Neutrophils # Man  16.3 K/mm3 (1.8-7.7)  H  02/12/21  10:15    


 


Band Neutrophils #  0.0 K/mm3  02/12/21  10:15    


 


Lymphocytes # (Manual)  0.3 K/mm3 (1.2-5.4)  L  02/12/21  10:15    


 


Abs React Lymphs (Man)  0.0 K/mm3  02/12/21  10:15    


 


Monocytes # (Manual)  0.2 K/mm3 (0.0-0.8)   02/12/21  10:15    


 


Eosinophils # (Manual)  0.0 K/mm3 (0.0-0.4)   02/12/21  10:15    


 


Basophils # (Manual)  0.0 K/mm3 (0.0-0.1)   02/12/21  10:15    


 


Metamyelocytes #  0.0 K/mm3  02/12/21  10:15    


 


Myelocytes #  0.0 K/mm3  02/12/21  10:15    


 


Promyelocytes #  0.0 K/mm3  02/12/21  10:15    


 


Blast Cells #  0.0 K/mm3  02/12/21  10:15    


 


WBC Morphology  Not Reportable   02/12/21  10:15    


 


Hypersegmented Neuts  Not Reportable   02/12/21  10:15    


 


Hyposegmented Neuts  Not Reportable   02/12/21  10:15    


 


Hypogranular Neuts  Not Reportable   02/12/21  10:15    


 


Smudge Cells  Not Reportable   02/12/21  10:15    


 


Toxic Granulation  Not Reportable   02/12/21  10:15    


 


Toxic Vacuolation  Not Reportable   02/12/21  10:15    


 


Dohle Bodies  Not Reportable   02/12/21  10:15    


 


Pelger-Huet Anomaly  Not Reportable   02/12/21  10:15    


 


Wendy Rods  Not Reportable   02/12/21  10:15    


 


Platelet Estimate  Consistent w auto   02/12/21  10:15    


 


Clumped Platelets  Not Reportable   02/12/21  10:15    


 


Plt Clumps, EDTA  Not Reportable   02/12/21  10:15    


 


Large Platelets  Not Reportable   02/12/21  10:15    


 


Giant Platelets  Rare   02/12/21  10:15    


 


Platelet Satelliting  Not Reportable   02/12/21  10:15    


 


Plt Morphology Comment  Not Reportable   02/12/21  10:15    


 


RBC Morphology  Normal   02/12/21  10:15    


 


Dimorphic RBCs  Not Reportable   02/12/21  10:15    


 


Polychromasia  Not Reportable   02/12/21  10:15    


 


Hypochromasia  Not Reportable   02/12/21  10:15    


 


Poikilocytosis  Not Reportable   02/12/21  10:15    


 


Anisocytosis  Not Reportable   02/12/21  10:15    


 


Microcytosis  Not Reportable   02/12/21  10:15    


 


Macrocytosis  Not Reportable   02/12/21  10:15    


 


Spherocytes  Not Reportable   02/12/21  10:15    


 


Pappenheimer Bodies  Not Reportable   02/12/21  10:15    


 


Sickle Cells  Not Reportable   02/12/21  10:15    


 


Target Cells  Not Reportable   02/12/21  10:15    


 


Tear Drop Cells  Not Reportable   02/12/21  10:15    


 


Ovalocytes  Not Reportable   02/12/21  10:15    


 


Helmet Cells  Not Reportable   02/12/21  10:15    


 


[place] Bodies  Not Reportable   02/12/21  10:15    


 


Cabot Rings  Not Reportable   02/12/21  10:15    


 


[place] Cells  Not Reportable   02/12/21  10:15    


 


Bite Cells  Not Reportable   02/12/21  10:15    


 


Crenated Cell  Not Reportable   02/12/21  10:15    


 


Elliptocytes  Not Reportable   02/12/21  10:15    


 


Acanthocytes (Spur)  Not Reportable   02/12/21  10:15    


 


Rouleaux  Not Reportable   02/12/21  10:15    


 


Hemoglobin C Crystals  Not Reportable   02/12/21  10:15    


 


Schistocytes  Rare   02/12/21  10:15    


 


Malaria parasites  Not Reportable   02/12/21  10:15    


 


Kurtis Bodies  Not Reportable   02/12/21  10:15    


 


Hem Pathologist Commnt  No   02/12/21  10:15    


 


PT  15.1 Sec. (12.2-14.9)  H  02/13/21  09:28    


 


INR  1.21  (0.87-1.13)  H  02/13/21  09:28    


 


APTT  50.4 Sec. (24.2-36.6)  H  02/13/21  09:28    


 


Sodium  136 mmol/L (137-145)  L  02/15/21  05:21    


 


Potassium  5.0 mmol/L (3.6-5.0)   02/15/21  05:21    


 


Chloride  102.3 mmol/L ()   02/15/21  05:21    


 


Carbon Dioxide  20 mmol/L (22-30)  L  02/15/21  05:21    


 


Anion Gap  19 mmol/L  02/15/21  05:21    


 


BUN  44 mg/dL (7-17)  H  02/15/21  05:21    


 


Creatinine  7.4 mg/dL (0.6-1.2)  H  02/15/21  05:21    


 


Estimated GFR  8 ml/min  02/15/21  05:21    


 


BUN/Creatinine Ratio  6 %  02/15/21  05:21    


 


Glucose  134 mg/dL ()  H  02/15/21  05:21    


 


Uric Acid  7.1 mg/dL (3.5-7.6)   02/12/21  14:10    


 


Calcium  8.6 mg/dL (8.4-10.2)   02/15/21  05:21    


 


Phosphorus  5.10 mg/dL (2.5-4.5)  H  02/15/21  05:21    


 


Iron  19 ug/dL ()  L  02/13/21  04:40    


 


TIBC  176 mcg/dL (250-450)  L  02/13/21  04:40    


 


Ferritin  130.0 ng/mL (10.0-200.0)   02/13/21  04:40    


 


Lactate Dehydrogenase  296 units/L ()  H  02/12/21  10:15    


 


Total Creatine Kinase  152 units/L ()  H  02/12/21  10:15    


 


Troponin T  < 0.010 ng/mL (0.00-0.029)   02/12/21  01:08    


 


Lipase  71 units/L (13-60)  H  02/11/21  23:09    


 


PTH Intact  359.1 pg/mL (15-65)  H  02/13/21  16:10    


 


Urine Color  Colorless  (Yellow)   02/12/21  01:10    


 


Urine Turbidity  Clear  (Clear)   02/12/21  01:10    


 


Urine pH  7.0  (5.0-7.0)   02/12/21  01:10    


 


Ur Specific Gravity  1.007  (1.003-1.030)   02/12/21  01:10    


 


Urine Protein  >500 mg/dL (Negative)   02/12/21  01:10    


 


Urine Glucose (UA)  50 mg/dL (Negative)   02/12/21  01:10    


 


Urine Ketones  Neg mg/dL (Negative)   02/12/21  01:10    


 


Urine Blood  Sm  (Negative)   02/12/21  01:10    


 


Urine Nitrite  Neg  (Negative)   02/12/21  01:10    


 


Urine Bilirubin  Neg  (Negative)   02/12/21  01:10    


 


Urine Urobilinogen  < 2.0 mg/dL (<2.0)   02/12/21  01:10    


 


Ur Leukocyte Esterase  Neg  (Negative)   02/12/21  01:10    


 


Urine WBC (Auto)  4.0 /HPF (0.0-6.0)   02/12/21  01:10    


 


Urine RBC (Auto)  15.0 /HPF (0.0-6.0)   02/12/21  01:10    


 


U Epithel Cells (Auto)  < 1.0 /HPF (0-13.0)   02/12/21  01:10    


 


Hyaline Casts  1 /LPF  02/11/21  22:00    


 


Urine Mucus  Few /HPF  02/12/21  01:10    


 


Urine Eosinophils  Rare eosinophil seen  (None Seen)   02/12/21  15:47    


 


Urine Creatinine  45.6 mg/dL (0.1-20.0)  H  02/12/21  15:38    


 


Urine Creatinine  46.0 mg/dL (0.1-20.0)  H  02/12/21  15:38    


 


Protein/Creatinin Ratio  5.04   02/12/21  15:38    


 


Urine Sodium  104 mmol/L  02/12/21  15:38    


 


Urine Total Protein  230 mg/dL (5-11.8)  H  02/12/21  15:38    


 


Urine HCG, Qual  Negative  (Negative)   02/11/21  22:00    


 


Coronavirus (PCR)  Negative  (Negative)   02/13/21  10:15    


 


Hep Bs Antigen  Non-reactive  (Negative)   02/12/21  14:10    


 


Hepatitis C Antibody  Non-reactive  (NonReactive)   02/12/21  14:10    


 


HIV 1&2 Antibody Rapid  Non react  (Non React)   02/12/21  10:15    


 


HIV P24 Antigen  Non react  (Non React)   02/12/21  10:15    


 


Schistocytes Smear  Rare   02/12/21  10:15    











Microbiology: 


Microbiology





02/12/21 14:10   Peripheral/Venous   Blood Culture - Preliminary


                            NO GROWTH AFTER 48 HOURS


02/12/21 14:24   Peripheral/Venous   Blood Culture - Preliminary


                            NO GROWTH AFTER 48 HOURS


02/12/21 15:47   Urine,Clean Catch   Urine Culture - Final


                            NO GROWTH AFTER 48 HOURS











- Diagnostic Impressions


Diagnostic Impressions: 








Echocardiogram  02/12/21 01:20


Transthoracic Echocardiogram


 


Indication:


Chest pain


BP:           163/113


HR:           89


 


Conclusions


 *Unremarkable echo-doppler study.


 


Findings     


Left Ventricle:


The left ventricular chamber size is normal. Global left ventricular


wall motion and contractility are within normal limits. Global left


ventricular systolic function is normal. The estimated ejection fraction


is 55-60%.  Normal left ventricular diastolic filling is observed. 


 


Left Atrium:


The left atrial chamber size is normal. 


 


Right Ventricle:


The right ventricular cavity size is normal. 


 


Right Atrium:


The right atrial cavity size is normal. 


 


Aortic Valve:


The aortic valve structure is normal. There is no evidence of aortic


regurgitation. 


 


Mitral Valve:


The mitral valve leaflets are mildly thickened. There is no evidence of


mitral regurgitation. 


 


Tricuspid Valve:


The tricuspid valve leaflets are normal.  There is mild tricuspid


regurgitation. The right ventricular systolic pressure is calculated at


28 mmHg.  


 


Pulmonic Valve:


The pulmonic valve appears normal. There is trace pulmonic


regurgitation.  


 


Pericardium:


There is no pericardial effusion. 


 


Aorta:


The aorta appears normal.  


 


Venous:


The inferior vena cava appears normal. 


 


Measurements     


Chambers 2D


Name                    Value         Normal Range            


IVSd (2D)               0.96 cm       (0.6 - 1.1)             


LVPWd (2D)              0.9 cm        (0.6 - 1.1)             


LVIDd (2D)              4.59 cm       (3.7 - 5.6)             


LVIDs (2D)              2.99 cm       (2 - 3.8)               


LV FS (2D)              34.92 %       -                        


EF Teichholz (2D)       64.24 %       -                        


Ao root diameter (2D)   2.62 cm       (2 - 3.7)               


 


Volumes/Mass


Name                    Value         Normal Range            


LA ESV SP 4CH (A/L)     14.15 ml      -                        


LA ESV SP 2CH (A/L)     23.9 ml       -                        


LA ESV BP (A/L)         18.76 ml      -                        


LA ESV BP (A/L) index   12.03 ml/m2   -                        


LA ESV SP 4CH (MOD)     12.91 ml      -                        


LA ESV SP 2CH (MOD)     24.49 ml      -                        


LA ESV BP (MOD)         17.61 ml      -                        


LA ESV BP (MOD) index   11.29 ml/m2   -                        


 


Diastolic/Systolic Function


Name                    Value         Normal Range            


MV E-wave Vmax          0.88 m/sec    -                        


MV deceleration time    164.26 msec   -                        


MV A-wave Vmax          0.75 m/sec    -                        


MV E:A ratio            1.18 ratio    -                        


 


Aortic Valve


Name                    Value         Normal Range            


AV Vmax                 1.23 m/sec    -                        


AV VTI                  22.05 cm      -                        


AV peak gradient        6.04 mmHg     -                        


AV mean gradient        3.49 mmHg     -                        


LVOT diameter           2.05 cm       -                        


LVOT Vmax               1.01 m/sec    -                        


LVOT VTI                17.84 cm      -                        


LVOT peak gradient      4.1 mmHg      -                        


LVOT mean gradient      2.15 mmHg     -                        


SV LVOT                 59.03 ml      -                        


JONATHAN (continuity Vmax)   2.73 cm2      -                        


JONATHAN (continuity VTI)    2.68 cm2      -                        


Ascending Ao            2.64 cm       -                        


 


Tricuspid Valve


Name                    Value         Normal Range            


TR Vmax                 2.51 m/sec    -                        


TR peak gradient        25 mmHg       -                        


RAP                     3 mmHg        -                        


RVSP                    28 mmHg       -                        


IVC diameter            2.07 cm       (1.2 - 2.3)             


 


Pulmonic Valve/Qp:Qs


Name                    Value         Normal Range            


PV Vmax                 0.9 m/sec     -                        


PV peak gradient        3.24 mmHg     -                        


IL end-diastolic Vmax   0.92 m/sec    -                        


PV acceleration time    91.34 msec    -                        


 


Electronically signed by: Cayetano Gonzalez MD on 02/12/2021


10:32:02











Banks/IV: 


                                        





Voiding Method                   Toilet











Active Medications





- Current Medications


Current Medications: 














Generic Name Dose Route Start Last Admin





  Trade Name Freq  PRN Reason Stop Dose Admin


 


Acetaminophen  650 mg  02/12/21 01:07  02/13/21 04:30





  Acetaminophen 325 Mg Tab  PO   650 mg





  Q4H PRN   Administration





  Pain MILD(1-3)/Fever >100.5/HA  


 


Amlodipine Besylate  10 mg  02/12/21 12:00  02/14/21 12:05





  Amlodipine 10 Mg Tab  PO   10 mg





  QDAY KETTY   Administration


 


Atorvastatin Calcium  20 mg  02/12/21 22:00  02/14/21 22:20





  Atorvastatin 20 Mg Tab  PO   20 mg





  QHS KETTY   Administration


 


Calcium Carbonate/Glycine  1,250 mg  02/12/21 12:00  02/14/21 22:20





  Calcium Carbonate 1250 Mg Tab  PO   1,250 mg





  BID KETTY   Administration


 


Famotidine  10 mg  02/12/21 10:00  02/14/21 22:20





  Famotidine 10 Mg Tab  PO   10 mg





  BID KETTY   Administration


 


Fluticasone Propionate  50 mcg  02/12/21 10:00  02/14/21 12:05





  Fluticasone Propionate Nasal Spray 16 Gm  NS   50 mcg





  QDAY KETTY   Administration


 


Heparin Sodium (Porcine)  5,000 unit  02/12/21 06:00  02/15/21 05:22





  Heparin 5,000 Unit/1 Ml Vial  SUB-Q   5,000 unit





  Q8HR KETTY   Administration


 


Hydralazine HCl  10 mg  02/12/21 05:12  02/13/21 04:30





  Hydralazine 20 Mg/1 Ml Inj  IV   10 mg





  Q6HR PRN   Administration





  Blood Pressure  


 


Hydroxychloroquine Sulfate  200 mg  02/13/21 10:00  02/14/21 12:05





  Hydroxychloroquine 200 Mg Tab  PO   200 mg





  QDAY KETTY   Administration


 


Methylprednisolone Sodium  100 mls @ 200 mls/hr  02/14/21 17:00  02/15/21 00:46





Succinate 1,000 mg/ Sodium  IV  02/16/21 10:29  Infused





Chloride  Q24HR KETTY   Infusion


 


Morphine Sulfate  2 mg  02/12/21 01:07  02/15/21 00:49





  Morphine 2 Mg/1 Ml Inj  IV   2 mg





  Q4H PRN   Administration





  Pain, Moderate (4-6)  


 


Ondansetron HCl  4 mg  02/12/21 01:07  02/12/21 15:29





  Ondansetron 4 Mg/2 Ml Inj  IV   4 mg





  Q8H PRN   Administration





  Nausea And Vomiting  


 


Sodium Chloride  10 ml  02/12/21 10:00  02/14/21 22:21





  Sodium Chloride 0.9% 10 Ml Flush Syringe  IV   10 ml





  BID KETTY   Administration


 


Sodium Chloride  10 ml  02/12/21 01:07  02/13/21 04:30





  Sodium Chloride 0.9% 10 Ml Flush Syringe  IV   10 ml





  PRN PRN   Administration





  LINE FLUSH

## 2021-02-16 LAB
ANISOCYTOSIS BLD QL SMEAR: (no result)
APTT BLD: 33.6 SEC. (ref 24.2–36.6)
BAND NEUTROPHILS # (MANUAL): 0 K/MM3
BUN SERPL-MCNC: 50 MG/DL (ref 7–17)
BUN/CREAT SERPL: 7 %
CALCIUM SERPL-MCNC: 8.3 MG/DL (ref 8.4–10.2)
HCT VFR BLD CALC: 27.2 % (ref 30.3–42.9)
HEMOLYSIS INDEX: 0
HGB BLD-MCNC: 8.8 GM/DL (ref 10.1–14.3)
INR PPP: 1.01 (ref 0.87–1.13)
MCHC RBC AUTO-ENTMCNC: 32 % (ref 30–34)
MCV RBC AUTO: 82 FL (ref 79–97)
MYELOCYTES # (MANUAL): 0 K/MM3
PLATELET # BLD: 201 K/MM3 (ref 140–440)
PROMYELOCYTES # (MANUAL): 0 K/MM3
RBC # BLD AUTO: 3.32 M/MM3 (ref 3.65–5.03)
TOTAL CELLS COUNTED BLD: 100

## 2021-02-16 PROCEDURE — 05HY33Z INSERTION OF INFUSION DEVICE INTO UPPER VEIN, PERCUTANEOUS APPROACH: ICD-10-PCS | Performed by: HOSPITALIST

## 2021-02-16 RX ADMIN — HEPARIN SODIUM SCH: 5000 INJECTION, SOLUTION INTRAVENOUS; SUBCUTANEOUS at 06:42

## 2021-02-16 RX ADMIN — FAMOTIDINE SCH: 10 TABLET ORAL at 10:37

## 2021-02-16 RX ADMIN — FAMOTIDINE SCH MG: 10 TABLET ORAL at 22:58

## 2021-02-16 RX ADMIN — CALCIUM SCH: 500 TABLET ORAL at 10:36

## 2021-02-16 RX ADMIN — HYDROXYCHLOROQUINE SULFATE SCH MG: 200 TABLET ORAL at 18:07

## 2021-02-16 RX ADMIN — Medication SCH ML: at 23:01

## 2021-02-16 RX ADMIN — METHYLPREDNISOLONE SODIUM SUCCINATE SCH MLS/HR: 500 INJECTION, POWDER, FOR SOLUTION INTRAMUSCULAR; INTRAVENOUS at 10:40

## 2021-02-16 RX ADMIN — HYDROXYCHLOROQUINE SULFATE SCH: 200 TABLET ORAL at 10:37

## 2021-02-16 RX ADMIN — HEPARIN SODIUM SCH: 5000 INJECTION, SOLUTION INTRAVENOUS; SUBCUTANEOUS at 22:59

## 2021-02-16 RX ADMIN — Medication SCH ML: at 10:41

## 2021-02-16 RX ADMIN — CALCIUM SCH MG: 500 TABLET ORAL at 22:59

## 2021-02-16 RX ADMIN — HEPARIN SODIUM SCH: 5000 INJECTION, SOLUTION INTRAVENOUS; SUBCUTANEOUS at 17:20

## 2021-02-16 RX ADMIN — FLUTICASONE PROPIONATE SCH MCG: 50 SPRAY, METERED NASAL at 18:09

## 2021-02-16 NOTE — PROGRESS NOTE
Assessment and Plan


Assessment and plan: 





Acute kidney failure, lupus nephritis





Back pain





Chest pain





sepsis


Continue empiric Rocephin 1 g IV daily.  Patient is also on Diflucan.





Headache


Tylenol 650 p.o. every 6 hours as needed.  Morphine 1 to 2 mg IV every 4 hours 

as needed.











2/13/2021


-Patient is septic evidenced by leukocytosis and fever.  Patient is on IV Zosyn.

 UA is negative


-Patient has acute renal failure versus acute on chronic renal failure, with GFR

of 10, nephrology is following.  Patient has lupus and likely due to lupus 

nephritis


-Pain is likely due to lupus, I placed the patient on steroids and 

hydroxychloroquine


-Pain control





2/14/2021


-Patient is still leukocytosis [but trending down] but no fever over the last 24

hours.  Blood and urine cultures are negative.  Fever is likely due to lupus.  I

will stop the IV antibiotics


-Patient has acute renal failure and creatinine is worsening overnight increased

to 7.3.  Nephrology is following and planning kidney biopsy and possible 

dialysis.


-I put the patient on hydroxychloroquine and prednisone.





2/15/2021.  Patient with unknown baseline creatinine/renal function.  Patient 

likely has advanced CKD secondary to hypertension and possible lupus nephritis. 

 Renal US shows CKD signs and she has nephrotic range proteinuria.  Kidney 

biopsy per nephrology.  Pt says she will decide about dialysis after kidney bx, 

Cr is worsening.  Nephrology initiated pulse dosed steroids.  Hepatitis B and C-

, HIV negative.  Antibiotic discontinued.  Blood and urine cultures negative x48

hours.





2/16/2021.  Patient continues to have elevated creatinine and BUN.  Patient with

unknown baseline and likely has advanced CKD secondary to HTN and possible Lupus

Nephritis.  Renal US shows CKD signs and she has nephrotic range proteinuria.  

Patient has completed steroid pulse of 3 days.  C3/C4, ALISIA, ANCA, 

anti-GBM-pending.  HIV and Hep panel-negative.  Renal biopsy for today.  Ne

phrology with extensive discussion regarding hemodialysis,  risks and benefits 

of dialysis and medical complications that may arise if dialysis is not done to 

include Volume Overload, Uremia/Metabolic Encephalopathy and Death.  I 

reiterated the same risks.  Patient contemplating leaving AMA





History


Interval history: 





No new issues overnight.





Hospitalist Physical





- Constitutional


Vitals: 


                                        











Temp Pulse Resp BP Pulse Ox


 


 98.2 F   92 H  18   126/80   100 


 


 02/16/21 08:41  02/16/21 08:41  02/16/21 08:41  02/16/21 08:41  02/16/21 08:41











General appearance: Present: no acute distress, well-nourished





- EENT


Eyes: Present: PERRL, EOM intact


ENT: hearing intact, clear oral mucosa, dentition normal





- Neck


Neck: Present: supple, normal ROM





- Respiratory


Respiratory effort: normal


Respiratory: bilateral: CTA





- Cardiovascular


Rhythm: regular


Heart Sounds: Present: S1 & S2.  Absent: gallop, rub





- Extremities


Extremities: no ischemia, No edema, Full ROM





- Abdominal


General gastrointestinal: soft, non-tender, non-distended, normal bowel sounds





- Integumentary


Integumentary: Present: clear, warm, dry





- Neurologic


Neurologic: CNII-XII intact, moves all extremities





HEART Score





- HEART Score


EKG: Normal


Age: < 45


Risk factors: No known risk factors


Troponin: 


                                        











Troponin T  < 0.010 ng/mL (0.00-0.029)   02/12/21  01:08    











Troponin: < normal limit





Results





- Labs


CBC & Chem 7: 


                                 02/16/21 05:12





                                 02/16/21 05:12


Labs: 


                             Laboratory Last Values











WBC  18.0 K/mm3 (4.5-11.0)  H  02/16/21  05:12    


 


RBC  3.32 M/mm3 (3.65-5.03)  L  02/16/21  05:12    


 


Hgb  8.8 gm/dl (10.1-14.3)  L  02/16/21  05:12    


 


Hct  27.2 % (30.3-42.9)  L  02/16/21  05:12    


 


MCV  82 fl (79-97)   02/16/21  05:12    


 


MCH  27 pg (28-32)  L  02/16/21  05:12    


 


MCHC  32 % (30-34)   02/16/21  05:12    


 


RDW  14.0 % (13.2-15.2)   02/16/21  05:12    


 


Plt Count  201 K/mm3 (140-440)   02/16/21  05:12    


 


Lymph % (Auto)  7.3 % (13.4-35.0)  L  02/14/21  04:19    


 


Mono % (Auto)  7.7 % (0.0-7.3)  H  02/14/21  04:19    


 


Eos % (Auto)  0.1 % (0.0-4.3)   02/14/21  04:19    


 


Baso % (Auto)  0.2 % (0.0-1.8)   02/14/21  04:19    


 


Lymph # (Auto)  1.1 K/mm3 (1.2-5.4)  L  02/14/21  04:19    


 


Mono # (Auto)  1.2 K/mm3 (0.0-0.8)  H  02/14/21  04:19    


 


Eos # (Auto)  0.0 K/mm3 (0.0-0.4)   02/14/21  04:19    


 


Baso # (Auto)  0.0 K/mm3 (0.0-0.1)   02/14/21  04:19    


 


Add Manual Diff  Complete   02/15/21  05:21    


 


Total Counted  100   02/15/21  05:21    


 


Seg Neutrophils %  Np   02/16/21  05:12    


 


Seg Neuts % (Manual)  98.0 % (40.0-70.0)  H  02/15/21  05:21    


 


Lymphocytes % (Manual)  2.0 % (13.4-35.0)  L  02/15/21  05:21    


 


Monocytes % (Manual)  1.0 % (0.0-7.3)   02/12/21  10:15    


 


Nucleated RBC %  Not Reportable   02/15/21  05:21    


 


Seg Neutrophils #  12.6 K/mm3 (1.8-7.7)  H  02/14/21  04:19    


 


Seg Neutrophils # Man  11.7 K/mm3 (1.8-7.7)  H  02/15/21  05:21    


 


Band Neutrophils #  0.0 K/mm3  02/15/21  05:21    


 


Lymphocytes # (Manual)  0.2 K/mm3 (1.2-5.4)  L  02/15/21  05:21    


 


Abs React Lymphs (Man)  0.0 K/mm3  02/15/21  05:21    


 


Monocytes # (Manual)  0.0 K/mm3 (0.0-0.8)   02/15/21  05:21    


 


Eosinophils # (Manual)  0.0 K/mm3 (0.0-0.4)   02/15/21  05:21    


 


Basophils # (Manual)  0.0 K/mm3 (0.0-0.1)   02/15/21  05:21    


 


Metamyelocytes #  0.0 K/mm3  02/15/21  05:21    


 


Myelocytes #  0.0 K/mm3  02/15/21  05:21    


 


Promyelocytes #  0.0 K/mm3  02/15/21  05:21    


 


Blast Cells #  0.0 K/mm3  02/15/21  05:21    


 


WBC Morphology  Not Reportable   02/15/21  05:21    


 


Hypersegmented Neuts  Not Reportable   02/15/21  05:21    


 


Hyposegmented Neuts  Not Reportable   02/15/21  05:21    


 


Hypogranular Neuts  Not Reportable   02/15/21  05:21    


 


Smudge Cells  Not Reportable   02/15/21  05:21    


 


Toxic Granulation  Not Reportable   02/15/21  05:21    


 


Toxic Vacuolation  Not Reportable   02/15/21  05:21    


 


Dohle Bodies  Not Reportable   02/15/21  05:21    


 


Pelger-Huet Anomaly  Not Reportable   02/15/21  05:21    


 


Wendy Rods  Not Reportable   02/15/21  05:21    


 


Platelet Estimate  Consistent w auto   02/15/21  05:21    


 


Clumped Platelets  Not Reportable   02/15/21  05:21    


 


Plt Clumps, EDTA  Not Reportable   02/15/21  05:21    


 


Large Platelets  Not Reportable   02/15/21  05:21    


 


Giant Platelets  Not Reportable   02/15/21  05:21    


 


Platelet Satelliting  Not Reportable   02/15/21  05:21    


 


Plt Morphology Comment  Not Reportable   02/15/21  05:21    


 


RBC Morphology  Not Reportable   02/15/21  05:21    


 


Dimorphic RBCs  Not Reportable   02/15/21  05:21    


 


Polychromasia  Not Reportable   02/15/21  05:21    


 


Hypochromasia  Not Reportable   02/15/21  05:21    


 


Poikilocytosis  Not Reportable   02/15/21  05:21    


 


Anisocytosis  Rare   02/15/21  05:21    


 


Microcytosis  Not Reportable   02/15/21  05:21    


 


Macrocytosis  Not Reportable   02/15/21  05:21    


 


Spherocytes  Not Reportable   02/15/21  05:21    


 


Pappenheimer Bodies  Not Reportable   02/15/21  05:21    


 


Sickle Cells  Not Reportable   02/15/21  05:21    


 


Target Cells  Not Reportable   02/15/21  05:21    


 


Tear Drop Cells  Not Reportable   02/15/21  05:21    


 


Ovalocytes  Not Reportable   02/15/21  05:21    


 


Helmet Cells  Not Reportable   02/15/21  05:21    


 


Chopra-Fisk Bodies  Not Reportable   02/15/21  05:21    


 


Cabot Rings  Not Reportable   02/15/21  05:21    


 


[place] Cells  Not Reportable   02/15/21  05:21    


 


Bite Cells  Not Reportable   02/15/21  05:21    


 


Crenated Cell  Not Reportable   02/15/21  05:21    


 


Elliptocytes  Not Reportable   02/15/21  05:21    


 


Acanthocytes (Spur)  Not Reportable   02/15/21  05:21    


 


Rouleaux  Not Reportable   02/15/21  05:21    


 


Hemoglobin C Crystals  Not Reportable   02/15/21  05:21    


 


Schistocytes  Not Reportable   02/15/21  05:21    


 


Malaria parasites  Not Reportable   02/15/21  05:21    


 


Kurtis Bodies  Not Reportable   02/15/21  05:21    


 


Hem Pathologist Commnt  No   02/15/21  05:21    


 


PT  13.1 Sec. (12.2-14.9)   02/16/21  08:43    


 


INR  1.01  (0.87-1.13)   02/16/21  08:43    


 


APTT  33.6 Sec. (24.2-36.6)   02/16/21  08:43    


 


Sodium  136 mmol/L (137-145)  L  02/16/21  05:12    


 


Potassium  4.7 mmol/L (3.6-5.0)   02/16/21  05:12    


 


Chloride  103.6 mmol/L ()   02/16/21  05:12    


 


Carbon Dioxide  21 mmol/L (22-30)  L  02/16/21  05:12    


 


Anion Gap  16 mmol/L  02/16/21  05:12    


 


BUN  50 mg/dL (7-17)  H  02/16/21  05:12    


 


Creatinine  7.5 mg/dL (0.6-1.2)  H  02/16/21  05:12    


 


Estimated GFR  8 ml/min  02/16/21  05:12    


 


BUN/Creatinine Ratio  7 %  02/16/21  05:12    


 


Glucose  132 mg/dL ()  H  02/16/21  05:12    


 


Uric Acid  7.1 mg/dL (3.5-7.6)   02/12/21  14:10    


 


Calcium  8.3 mg/dL (8.4-10.2)  L  02/16/21  05:12    


 


Phosphorus  5.70 mg/dL (2.5-4.5)  H  02/16/21  05:12    


 


Iron  19 ug/dL ()  L  02/13/21  04:40    


 


TIBC  176 mcg/dL (250-450)  L  02/13/21  04:40    


 


Ferritin  130.0 ng/mL (10.0-200.0)   02/13/21  04:40    


 


Lactate Dehydrogenase  296 units/L ()  H  02/12/21  10:15    


 


Total Creatine Kinase  152 units/L ()  H  02/12/21  10:15    


 


Troponin T  < 0.010 ng/mL (0.00-0.029)   02/12/21  01:08    


 


Lipase  71 units/L (13-60)  H  02/11/21  23:09    


 


PTH Intact  359.1 pg/mL (15-65)  H  02/13/21  16:10    


 


Urine Color  Colorless  (Yellow)   02/12/21  01:10    


 


Urine Turbidity  Clear  (Clear)   02/12/21  01:10    


 


Urine pH  7.0  (5.0-7.0)   02/12/21  01:10    


 


Ur Specific Gravity  1.007  (1.003-1.030)   02/12/21  01:10    


 


Urine Protein  >500 mg/dL (Negative)   02/12/21  01:10    


 


Urine Glucose (UA)  50 mg/dL (Negative)   02/12/21  01:10    


 


Urine Ketones  Neg mg/dL (Negative)   02/12/21  01:10    


 


Urine Blood  Sm  (Negative)   02/12/21  01:10    


 


Urine Nitrite  Neg  (Negative)   02/12/21  01:10    


 


Urine Bilirubin  Neg  (Negative)   02/12/21  01:10    


 


Urine Urobilinogen  < 2.0 mg/dL (<2.0)   02/12/21  01:10    


 


Ur Leukocyte Esterase  Neg  (Negative)   02/12/21  01:10    


 


Urine WBC (Auto)  4.0 /HPF (0.0-6.0)   02/12/21  01:10    


 


Urine RBC (Auto)  15.0 /HPF (0.0-6.0)   02/12/21  01:10    


 


U Epithel Cells (Auto)  < 1.0 /HPF (0-13.0)   02/12/21  01:10    


 


Hyaline Casts  1 /LPF  02/11/21  22:00    


 


Urine Mucus  Few /HPF  02/12/21  01:10    


 


Urine Eosinophils  Rare eosinophil seen  (None Seen)   02/12/21  15:47    


 


Urine Creatinine  45.6 mg/dL (0.1-20.0)  H  02/12/21  15:38    


 


Urine Creatinine  46.0 mg/dL (0.1-20.0)  H  02/12/21  15:38    


 


Protein/Creatinin Ratio  5.04   02/12/21  15:38    


 


Urine Sodium  104 mmol/L  02/12/21  15:38    


 


Urine Total Protein  230 mg/dL (5-11.8)  H  02/12/21  15:38    


 


Urine HCG, Qual  Negative  (Negative)   02/11/21  22:00    


 


Complement C3  118 mg/dL ()   02/12/21  10:15    


 


Complement C4  24 mg/dL (15-57)   02/12/21  10:15    


 


Coronavirus (PCR)  Negative  (Negative)   02/13/21  10:15    


 


Hep Bs Antigen  Non-reactive  (Negative)   02/12/21  14:10    


 


Hepatitis C Antibody  Non-reactive  (NonReactive)   02/12/21  14:10    


 


HIV 1&2 Antibody Rapid  Non react  (Non React)   02/12/21  10:15    


 


HIV P24 Antigen  Non react  (Non React)   02/12/21  10:15    


 


Schistocytes Smear  Rare   02/12/21  10:15    











Microbiology: 


Microbiology





02/12/21 14:10   Peripheral/Venous   Blood Culture - Preliminary


                            NO GROWTH AFTER 72 HOURS


02/12/21 14:24   Peripheral/Venous   Blood Culture - Preliminary


                            NO GROWTH AFTER 72 HOURS











- Diagnostic Impressions


Diagnostic Impressions: 








Echocardiogram  02/12/21 01:20


Transthoracic Echocardiogram


 


Indication:


Chest pain


BP:           163/113


HR:           89


 


Conclusions


 *Unremarkable echo-doppler study.


 


Findings     


Left Ventricle:


The left ventricular chamber size is normal. Global left ventricular


wall motion and contractility are within normal limits. Global left


ventricular systolic function is normal. The estimated ejection fraction


is 55-60%.  Normal left ventricular diastolic filling is observed. 


 


Left Atrium:


The left atrial chamber size is normal. 


 


Right Ventricle:


The right ventricular cavity size is normal. 


 


Right Atrium:


The right atrial cavity size is normal. 


 


Aortic Valve:


The aortic valve structure is normal. There is no evidence of aortic


regurgitation. 


 


Mitral Valve:


The mitral valve leaflets are mildly thickened. There is no evidence of


mitral regurgitation. 


 


Tricuspid Valve:


The tricuspid valve leaflets are normal.  There is mild tricuspid


regurgitation. The right ventricular systolic pressure is calculated at


28 mmHg.  


 


Pulmonic Valve:


The pulmonic valve appears normal. There is trace pulmonic


regurgitation.  


 


Pericardium:


There is no pericardial effusion. 


 


Aorta:


The aorta appears normal.  


 


Venous:


The inferior vena cava appears normal. 


 


Measurements     


Chambers 2D


Name                    Value         Normal Range            


IVSd (2D)               0.96 cm       (0.6 - 1.1)             


LVPWd (2D)              0.9 cm        (0.6 - 1.1)             


LVIDd (2D)              4.59 cm       (3.7 - 5.6)             


LVIDs (2D)              2.99 cm       (2 - 3.8)               


LV FS (2D)              34.92 %       -                        


EF Teichholz (2D)       64.24 %       -                        


Ao root diameter (2D)   2.62 cm       (2 - 3.7)               


 


Volumes/Mass


Name                    Value         Normal Range            


LA ESV SP 4CH (A/L)     14.15 ml      -                        


LA ESV SP 2CH (A/L)     23.9 ml       -                        


LA ESV BP (A/L)         18.76 ml      -                        


LA ESV BP (A/L) index   12.03 ml/m2   -                        


LA ESV SP 4CH (MOD)     12.91 ml      -                        


LA ESV SP 2CH (MOD)     24.49 ml      -                        


LA ESV BP (MOD)         17.61 ml      -                        


LA ESV BP (MOD) index   11.29 ml/m2   -                        


 


Diastolic/Systolic Function


Name                    Value         Normal Range            


MV E-wave Vmax          0.88 m/sec    -                        


MV deceleration time    164.26 msec   -                        


MV A-wave Vmax          0.75 m/sec    -                        


MV E:A ratio            1.18 ratio    -                        


 


Aortic Valve


Name                    Value         Normal Range            


AV Vmax                 1.23 m/sec    -                        


AV VTI                  22.05 cm      -                        


AV peak gradient        6.04 mmHg     -                        


AV mean gradient        3.49 mmHg     -                        


LVOT diameter           2.05 cm       -                        


LVOT Vmax               1.01 m/sec    -                        


LVOT VTI                17.84 cm      -                        


LVOT peak gradient      4.1 mmHg      -                        


LVOT mean gradient      2.15 mmHg     -                        


SV LVOT                 59.03 ml      -                        


JONATHAN (continuity Vmax)   2.73 cm2      -                        


JONATHAN (continuity VTI)    2.68 cm2      -                        


Ascending Ao            2.64 cm       -                        


 


Tricuspid Valve


Name                    Value         Normal Range            


TR Vmax                 2.51 m/sec    -                        


TR peak gradient        25 mmHg       -                        


RAP                     3 mmHg        -                        


RVSP                    28 mmHg       -                        


IVC diameter            2.07 cm       (1.2 - 2.3)             


 


Pulmonic Valve/Qp:Qs


Name                    Value         Normal Range            


PV Vmax                 0.9 m/sec     -                        


PV peak gradient        3.24 mmHg     -                        


W[place] end-diastolic Vmax   0.92 m/sec    -                        


PV acceleration time    91.34 msec    -                        


 


Electronically signed by: Cayetano Gonzalez MD on 02/12/2021


10:32:02











Banks/IV: 


                                        





Voiding Method                   Toilet











Active Medications





- Current Medications


Current Medications: 














Generic Name Dose Route Start Last Admin





  Trade Name Freq  PRN Reason Stop Dose Admin


 


Acetaminophen  650 mg  02/12/21 01:07  02/13/21 04:30





  Acetaminophen 325 Mg Tab  PO   650 mg





  Q4H PRN   Administration





  Pain MILD(1-3)/Fever >100.5/HA  


 


Amlodipine Besylate  10 mg  02/12/21 12:00  02/15/21 13:13





  Amlodipine 10 Mg Tab  PO   10 mg





  QDAY KETTY   Administration


 


Atorvastatin Calcium  20 mg  02/12/21 22:00  02/15/21 22:17





  Atorvastatin 20 Mg Tab  PO   20 mg





  QHS KETTY   Administration


 


Calcium Carbonate/Glycine  1,250 mg  02/12/21 12:00  02/15/21 22:16





  Calcium Carbonate 1250 Mg Tab  PO   1,250 mg





  BID KETTY   Administration


 


Famotidine  10 mg  02/12/21 10:00  02/15/21 22:16





  Famotidine 10 Mg Tab  PO   10 mg





  BID KETTY   Administration


 


Fluticasone Propionate  50 mcg  02/12/21 10:00  02/15/21 13:50





  Fluticasone Propionate Nasal Spray 16 Gm  NS   50 mcg





  QDAY KETTY   Administration


 


Heparin Sodium (Porcine)  5,000 unit  02/12/21 06:00  02/16/21 06:42





  Heparin 5,000 Unit/1 Ml Vial  SUB-Q   Not Given





  Q8HR KETTY  


 


Hydralazine HCl  10 mg  02/12/21 05:12  02/13/21 04:30





  Hydralazine 20 Mg/1 Ml Inj  IV   10 mg





  Q6HR PRN   Administration





  Blood Pressure  


 


Hydroxychloroquine Sulfate  200 mg  02/13/21 10:00  02/15/21 13:15





  Hydroxychloroquine 200 Mg Tab  PO   200 mg





  QDAY KETTY   Administration


 


Methylprednisolone Sodium  100 mls @ 200 mls/hr  02/14/21 17:00  02/15/21 19:13





Succinate 1,000 mg/ Sodium  IV  02/16/21 10:29  200 mls/hr





Chloride  Q24HR KETTY   Administration


 


Morphine Sulfate  2 mg  02/12/21 01:07  02/15/21 00:49





  Morphine 2 Mg/1 Ml Inj  IV   2 mg





  Q4H PRN   Administration





  Pain, Moderate (4-6)  


 


Ondansetron HCl  4 mg  02/12/21 01:07  02/12/21 15:29





  Ondansetron 4 Mg/2 Ml Inj  IV   4 mg





  Q8H PRN   Administration





  Nausea And Vomiting  


 


Sodium Chloride  10 ml  02/12/21 10:00  02/15/21 22:17





  Sodium Chloride 0.9% 10 Ml Flush Syringe  IV   10 ml





  BID KETTY   Administration


 


Sodium Chloride  10 ml  02/12/21 01:07  02/13/21 04:30





  Sodium Chloride 0.9% 10 Ml Flush Syringe  IV   10 ml





  PRN PRN   Administration





  LINE FLUSH

## 2021-02-16 NOTE — PROGRESS NOTE
Assessment and Plan


Severe renal failure


Hypertension


Chest pain


Dysuria


Headache








Plan:


-cont to have elevated Cr and BUN


-Unknown baseline, likely advanced CKD secondary to HTN and possible Lupus 

Nephritis


-Renal US shows CKD signs and she has nephrotic range proteinuria. 


-completed steroid pulse of 3 days


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


-C3/C4, ALISIA, ANCA, anti-GBM-pending


-HIV and Hep panel-negative


-Patient states she will decide about dialysis after kidney biopsy, no acute RRT

indication today


-Renal biopsy pending today


-Discussed extensively with patient the different types of dialysis modalities, 

spent 40 minutes regarding what needs to be done next after kidney biopsy, she 

stated if the number tomorrow remains to show low kidney function or no 

improvement she might be agreeable to start HD


-Continue to monitor renal function closely











Subjective


Date of service: 02/16/21


Principal diagnosis: renal failure


Interval history: 


awaiting kidney biopsy, very frustrated why the procedure was not done yet








Objective





- Vital Signs


Vital signs: 


                               Vital Signs - 12hr











  02/16/21 02/16/21 02/16/21





  00:38 02:00 04:36


 


Temperature 98.5 F  98.3 F


 


Pulse Rate 82 74 78


 


Respiratory 16  18





Rate   


 


Blood Pressure 120/75  119/77


 


O2 Sat by Pulse 97  98





Oximetry   














- General Appearance


General appearance: well-developed, well-nourished, appears stated age


EENT: ATNC, PERRL


Neck: no JVD, no carotid bruit


Respiratory: Present: Clear to Ascultation


Cardiology: regular, S1S2


Gastrointestinal: normoactive bowel sounds


Integumentary: no rash, warm and dry


Neurologic: no focal deficit, no asterixis, alert and oriented x3


Musculoskeletal: other (no edema in BLE)


Psychiatric: cooperative





- Lab





                                 02/16/21 05:12





                                 02/16/21 05:12


                             Most recent lab results











Calcium  8.3 mg/dL (8.4-10.2)  L  02/16/21  05:12    


 


Phosphorus  5.70 mg/dL (2.5-4.5)  H  02/16/21  05:12    


 


Urine Creatinine  45.6 mg/dL (0.1-20.0)  H  02/12/21  15:38    


 


Urine Creatinine  46.0 mg/dL (0.1-20.0)  H  02/12/21  15:38    


 


Urine Sodium  104 mmol/L  02/12/21  15:38    


 


Urine Total Protein  230 mg/dL (5-11.8)  H  02/12/21  15:38    














Medications & Allergies





- Medications


Allergies/Adverse Reactions: 


                                    Allergies





No Known Allergies Allergy (Verified 03/29/20 20:14)


   








Home Medications: 


                                Home Medications











 Medication  Instructions  Recorded  Confirmed  Last Taken  Type


 


Fluconazole (Nf) [Diflucan TAB] 150 mg PO ONCE #2 tablet 07/10/19  Unknown Rx


 


metroNIDAZOLE [Flagyl TAB] 500 mg PO Q12HR #14 tab 12/13/19  Unknown Rx


 


Cetirizine HCl [ZyrTEC] 10 mg PO QAM 14 Days #14 capsule 03/29/20  Unknown Rx


 


Fluticasone [Flonase] 1 spray NS QDAY 14 Days #1 bottle 03/29/20  Unknown Rx











Active Medications: 














Generic Name Dose Route Start Last Admin





  Trade Name Freq  PRN Reason Stop Dose Admin


 


Acetaminophen  650 mg  02/12/21 01:07  02/13/21 04:30





  Acetaminophen 325 Mg Tab  PO   650 mg





  Q4H PRN   Administration





  Pain MILD(1-3)/Fever >100.5/HA  


 


Amlodipine Besylate  10 mg  02/12/21 12:00  02/15/21 13:13





  Amlodipine 10 Mg Tab  PO   10 mg





  QDAY KETTY   Administration


 


Atorvastatin Calcium  20 mg  02/12/21 22:00  02/15/21 22:17





  Atorvastatin 20 Mg Tab  PO   20 mg





  QHS KETTY   Administration


 


Calcium Carbonate/Glycine  1,250 mg  02/12/21 12:00  02/15/21 22:16





  Calcium Carbonate 1250 Mg Tab  PO   1,250 mg





  BID KETTY   Administration


 


Epoetin Jorge  20,000 unit  02/16/21 09:00 





  Epoetin Jorge 2,000 Unit/1 Ml Vial  IV  02/16/21 09:01 





  ONCE ONE  


 


Famotidine  10 mg  02/12/21 10:00  02/15/21 22:16





  Famotidine 10 Mg Tab  PO   10 mg





  BID KETTY   Administration


 


Fluticasone Propionate  50 mcg  02/12/21 10:00  02/15/21 13:50





  Fluticasone Propionate Nasal Spray 16 Gm  NS   50 mcg





  QDAY KETTY   Administration


 


Heparin Sodium (Porcine)  5,000 unit  02/12/21 06:00  02/16/21 06:42





  Heparin 5,000 Unit/1 Ml Vial  SUB-Q   Not Given





  Q8HR KETTY  


 


Hydralazine HCl  10 mg  02/12/21 05:12  02/13/21 04:30





  Hydralazine 20 Mg/1 Ml Inj  IV   10 mg





  Q6HR PRN   Administration





  Blood Pressure  


 


Hydroxychloroquine Sulfate  200 mg  02/13/21 10:00  02/15/21 13:15





  Hydroxychloroquine 200 Mg Tab  PO   200 mg





  QDAY KETTY   Administration


 


Methylprednisolone Sodium  100 mls @ 200 mls/hr  02/14/21 17:00  02/15/21 19:13





Succinate 1,000 mg/ Sodium  IV  02/16/21 10:29  200 mls/hr





Chloride  Q24HR KETTY   Administration


 


Morphine Sulfate  2 mg  02/12/21 01:07  02/15/21 00:49





  Morphine 2 Mg/1 Ml Inj  IV   2 mg





  Q4H PRN   Administration





  Pain, Moderate (4-6)  


 


Ondansetron HCl  4 mg  02/12/21 01:07  02/12/21 15:29





  Ondansetron 4 Mg/2 Ml Inj  IV   4 mg





  Q8H PRN   Administration





  Nausea And Vomiting  


 


Sodium Chloride  10 ml  02/12/21 10:00  02/15/21 22:17





  Sodium Chloride 0.9% 10 Ml Flush Syringe  IV   10 ml





  BID KETTY   Administration


 


Sodium Chloride  10 ml  02/12/21 01:07  02/13/21 04:30





  Sodium Chloride 0.9% 10 Ml Flush Syringe  IV   10 ml





  PRN PRN   Administration





  LINE FLUSH

## 2021-02-17 LAB
ANISOCYTOSIS BLD QL SMEAR: (no result)
BAND NEUTROPHILS # (MANUAL): 0 K/MM3
BUN SERPL-MCNC: 63 MG/DL (ref 7–17)
BUN/CREAT SERPL: 8 %
CALCIUM SERPL-MCNC: 8.1 MG/DL (ref 8.4–10.2)
HCT VFR BLD CALC: 27.7 % (ref 30.3–42.9)
HEMOLYSIS INDEX: 2
HGB BLD-MCNC: 9.1 GM/DL (ref 10.1–14.3)
MCHC RBC AUTO-ENTMCNC: 33 % (ref 30–34)
MCV RBC AUTO: 81 FL (ref 79–97)
MYELOCYTES # (MANUAL): 0 K/MM3
PLATELET # BLD: 242 K/MM3 (ref 140–440)
PROMYELOCYTES # (MANUAL): 0 K/MM3
RBC # BLD AUTO: 3.44 M/MM3 (ref 3.65–5.03)
TOTAL CELLS COUNTED BLD: 100

## 2021-02-17 PROCEDURE — 0TB13ZX EXCISION OF LEFT KIDNEY, PERCUTANEOUS APPROACH, DIAGNOSTIC: ICD-10-PCS

## 2021-02-17 RX ADMIN — Medication SCH ML: at 21:51

## 2021-02-17 RX ADMIN — HYDROXYCHLOROQUINE SULFATE SCH MG: 200 TABLET ORAL at 09:07

## 2021-02-17 RX ADMIN — CALCIUM SCH MG: 500 TABLET ORAL at 21:51

## 2021-02-17 RX ADMIN — FLUTICASONE PROPIONATE SCH MCG: 50 SPRAY, METERED NASAL at 09:07

## 2021-02-17 RX ADMIN — HYDRALAZINE HYDROCHLORIDE PRN MG: 20 INJECTION INTRAMUSCULAR; INTRAVENOUS at 11:19

## 2021-02-17 RX ADMIN — FAMOTIDINE SCH MG: 10 TABLET ORAL at 09:08

## 2021-02-17 RX ADMIN — FAMOTIDINE SCH MG: 10 TABLET ORAL at 21:51

## 2021-02-17 RX ADMIN — CALCIUM SCH MG: 500 TABLET ORAL at 09:08

## 2021-02-17 RX ADMIN — Medication SCH ML: at 09:08

## 2021-02-17 RX ADMIN — HEPARIN SODIUM SCH UNIT: 5000 INJECTION, SOLUTION INTRAVENOUS; SUBCUTANEOUS at 21:50

## 2021-02-17 RX ADMIN — HEPARIN SODIUM SCH: 5000 INJECTION, SOLUTION INTRAVENOUS; SUBCUTANEOUS at 05:26

## 2021-02-17 RX ADMIN — HEPARIN SODIUM SCH: 5000 INJECTION, SOLUTION INTRAVENOUS; SUBCUTANEOUS at 14:21

## 2021-02-17 NOTE — PROGRESS NOTE
Assessment and Plan





Assessment


Severe renal failure


Hypertension


Chest pain


Dysuria


Headache








Plan:


-Renal labs reviewed. Serum creatinine 8.0 today, yesterday's was 7.5


-Unknown baseline, likely advanced CKD secondary to HTN and possible Lupus 

Nephritis


-Renal biopsy done today   


-Renal US shows CKD signs and she has nephrotic range proteinuria. 


-S/P Methylprednisolone steroid x 3 days


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


-C3/C4, ALISIA, ANCA, anti-GBM-pending


-HIV and Hep panel-negative


-Discussed/updated patient on her renal labs today and explained that there is 

no improvement in her renal function and that we still are recommending 

dialysis. 


-Patient has now agreed for perm-cath placement and dialysis initiation. Since 

patient just had renal biopsy done today, will order perm-cath placement for 

tomorrow morning and dialysis to be initiated after perm-cath placement. 


-Consulted IR for perm-cath placement tomorrow


-Will consult case management to start making arrangements for outpatient HD at 

[facility]








Subjective


Date of service: 02/17/21


Principal diagnosis: renal failure


Interval history: 





Patient seen lying in bed. S/P renal biopsy today. Answered all questions asked.





Objective





- Vital Signs


Vital signs: 


                               Vital Signs - 12hr











  02/17/21 02/17/21 02/17/21





  00:00 02:00 03:50


 


Temperature   98.5 F


 


Pulse Rate  87 84


 


Pulse Rate [   





Intra-Procedure   





]   


 


Pulse Rate [   





Post-Procedure]   


 


Respiratory 18  16





Rate   


 


Respiratory   





Rate [Intra-   





Procedure]   


 


Blood Pressure   127/83


 


Blood Pressure   





[Intra-   





Procedure]   


 


Blood Pressure   





[Post-Procedure   





]   


 


O2 Sat by Pulse   97





Oximetry   


 


O2 Sat by Pulse   





Oximetry [   





Intra-Procedure   





]   


 


O2 Sat by Pulse   





Oximetry [Post   





-Procedure]   














  02/17/21 02/17/21 02/17/21





  07:37 08:05 09:07


 


Temperature  98.4 F 


 


Pulse Rate 64 73 73


 


Pulse Rate [   





Intra-Procedure   





]   


 


Pulse Rate [   





Post-Procedure]   


 


Respiratory  16 





Rate   


 


Respiratory   





Rate [Intra-   





Procedure]   


 


Blood Pressure  121/77 122/77


 


Blood Pressure   





[Intra-   





Procedure]   


 


Blood Pressure   





[Post-Procedure   





]   


 


O2 Sat by Pulse  98 





Oximetry   


 


O2 Sat by Pulse   





Oximetry [   





Intra-Procedure   





]   


 


O2 Sat by Pulse   





Oximetry [Post   





-Procedure]   














  02/17/21 02/17/21 02/17/21





  11:03 11:11 11:19


 


Temperature   


 


Pulse Rate   


 


Pulse Rate [ 91 H 99 H 99 H





Intra-Procedure   





]   


 


Pulse Rate [   





Post-Procedure]   


 


Respiratory   





Rate   


 


Respiratory 16 14 14





Rate [Intra-   





Procedure]   


 


Blood Pressure   


 


Blood Pressure 153/102 145/94 145/95





[Intra-   





Procedure]   


 


Blood Pressure   





[Post-Procedure   





]   


 


O2 Sat by Pulse   





Oximetry   


 


O2 Sat by Pulse   





Oximetry [   





Intra-Procedure   





]   


 


O2 Sat by Pulse   





Oximetry [Post   





-Procedure]   














  02/17/21 02/17/21





  11:24 11:29


 


Temperature  


 


Pulse Rate  


 


Pulse Rate [ 99 H 





Intra-Procedure  





]  


 


Pulse Rate [  89





Post-Procedure]  


 


Respiratory  





Rate  


 


Respiratory 14 





Rate [Intra-  





Procedure]  


 


Blood Pressure  


 


Blood Pressure 140/93 





[Intra-  





Procedure]  


 


Blood Pressure  138/90





[Post-Procedure  





]  


 


O2 Sat by Pulse  





Oximetry  


 


O2 Sat by Pulse 87 





Oximetry [  





Intra-Procedure  





]  


 


O2 Sat by Pulse  87





Oximetry [Post  





-Procedure]  














- General Appearance


General appearance: well-developed, appears stated age


EENT: ATNC, PERRL, hearing intact, vision intact


Neck: no JVD, supple


Respiratory: Present: Decreased Breath Sounds


Cardiology: S1S2


Gastrointestinal: normoactive bowel sounds


Integumentary: warm and dry


Neurologic: alert and oriented x3


Musculoskeletal: other (No edema)





- Lab





                                 02/17/21 05:15





                                 02/17/21 05:15


                             Most recent lab results











Calcium  8.1 mg/dL (8.4-10.2)  L  02/17/21  05:15    


 


Phosphorus  5.20 mg/dL (2.5-4.5)  H  02/17/21  05:15    


 


Urine Creatinine  45.6 mg/dL (0.1-20.0)  H  02/12/21  15:38    


 


Urine Creatinine  46.0 mg/dL (0.1-20.0)  H  02/12/21  15:38    


 


Urine Sodium  104 mmol/L  02/12/21  15:38    


 


Urine Total Protein  230 mg/dL (5-11.8)  H  02/12/21  15:38    














Medications & Allergies





- Medications


Allergies/Adverse Reactions: 


                                    Allergies





No Known Allergies Allergy (Verified 03/29/20 20:14)


   








Home Medications: 


                                Home Medications











 Medication  Instructions  Recorded  Confirmed  Last Taken  Type


 


Fluconazole (Nf) [Diflucan TAB] 150 mg PO ONCE #2 tablet 07/10/19 02/16/21 

Unknown Rx


 


metroNIDAZOLE [Flagyl TAB] 500 mg PO Q12HR #14 tab 12/13/19 02/16/21 Unknown Rx


 


Cetirizine HCl [ZyrTEC] 10 mg PO QAM 14 Days #14 capsule 03/29/20 02/16/21 

Unknown Rx


 


Fluticasone [Flonase] 1 spray NS QDAY 14 Days #1 bottle 03/29/20 02/16/21 

Unknown Rx











Active Medications: 














Generic Name Dose Route Start Last Admin





  Trade Name Freq  PRN Reason Stop Dose Admin


 


Acetaminophen  650 mg  02/12/21 01:07  02/13/21 04:30





  Acetaminophen 325 Mg Tab  PO   650 mg





  Q4H PRN   Administration





  Pain MILD(1-3)/Fever >100.5/HA  


 


Amlodipine Besylate  10 mg  02/12/21 12:00  02/17/21 09:07





  Amlodipine 10 Mg Tab  PO   10 mg





  QDAY KETTY   Administration


 


Atorvastatin Calcium  20 mg  02/12/21 22:00  02/16/21 22:58





  Atorvastatin 20 Mg Tab  PO   20 mg





  QHS KETTY   Administration


 


Calcium Carbonate/Glycine  1,250 mg  02/12/21 12:00  02/17/21 09:08





  Calcium Carbonate 1250 Mg Tab  PO   1,250 mg





  BID KETTY   Administration


 


Famotidine  10 mg  02/12/21 10:00  02/17/21 09:08





  Famotidine 10 Mg Tab  PO   10 mg





  BID KETTY   Administration


 


Fluticasone Propionate  50 mcg  02/12/21 10:00  02/17/21 09:07





  Fluticasone Propionate Nasal Spray 16 Gm  NS   50 mcg





  QDAY KETTY   Administration


 


Heparin Sodium (Porcine)  5,000 unit  02/12/21 06:00  02/17/21 05:26





  Heparin 5,000 Unit/1 Ml Vial  SUB-Q   Not Given





  Q8HR KETTY  


 


Hydralazine HCl  10 mg  02/12/21 05:12  02/13/21 04:30





  Hydralazine 20 Mg/1 Ml Inj  IV   10 mg





  Q6HR PRN   Administration





  Blood Pressure  


 


Hydromorphone HCl  2 mg  02/17/21 10:30 





  Hydromorphone 2 Mg/1 Ml Inj  IV  02/17/21 14:00 





  ONCE KETTY  


 


Hydroxychloroquine Sulfate  200 mg  02/13/21 10:00  02/17/21 09:07





  Hydroxychloroquine 200 Mg Tab  PO   200 mg





  QDAY KETTY   Administration


 


Sodium Chloride  500 mls @ 50 mls/hr  02/16/21 11:00 





  Nacl 0.9% 500 Ml  IV  





  AS DIRECT KETTY  


 


Morphine Sulfate  2 mg  02/12/21 01:07  02/15/21 00:49





  Morphine 2 Mg/1 Ml Inj  IV   2 mg





  Q4H PRN   Administration





  Pain, Moderate (4-6)  


 


Ondansetron HCl  4 mg  02/12/21 01:07  02/12/21 15:29





  Ondansetron 4 Mg/2 Ml Inj  IV   4 mg





  Q8H PRN   Administration





  Nausea And Vomiting  


 


Ondansetron HCl  4 mg  02/17/21 10:30 





  Ondansetron 4 Mg/2 Ml Inj  IV  02/17/21 15:00 





  ONCE KETTY  


 


Sodium Chloride  10 ml  02/12/21 10:00  02/17/21 09:08





  Sodium Chloride 0.9% 10 Ml Flush Syringe  IV   10 ml





  BID KETTY   Administration


 


Sodium Chloride  10 ml  02/12/21 01:07  02/13/21 04:30





  Sodium Chloride 0.9% 10 Ml Flush Syringe  IV   10 ml





  PRN PRN   Administration





  LINE FLUSH

## 2021-02-17 NOTE — PROGRESS NOTE
Assessment and Plan


Assessment and plan: 





Acute kidney failure, lupus nephritis





Back pain





Chest pain





sepsis


Continue empiric Rocephin 1 g IV daily.  Patient is also on Diflucan.





Headache


Tylenol 650 p.o. every 6 hours as needed.  Morphine 1 to 2 mg IV every 4 hours 

as needed.











2/13/2021


-Patient is septic evidenced by leukocytosis and fever.  Patient is on IV Zosyn.

 UA is negative


-Patient has acute renal failure versus acute on chronic renal failure, with GFR

of 10, nephrology is following.  Patient has lupus and likely due to lupus 

nephritis


-Pain is likely due to lupus, I placed the patient on steroids and 

hydroxychloroquine


-Pain control





2/14/2021


-Patient is still leukocytosis [but trending down] but no fever over the last 24

hours.  Blood and urine cultures are negative.  Fever is likely due to lupus.  I

will stop the IV antibiotics


-Patient has acute renal failure and creatinine is worsening overnight increased

to 7.3.  Nephrology is following and planning kidney biopsy and possible 

dialysis.


-I put the patient on hydroxychloroquine and prednisone.





2/15/2021.  Patient with unknown baseline creatinine/renal function.  Patient 

likely has advanced CKD secondary to hypertension and possible lupus nephritis. 

 Renal US shows CKD signs and she has nephrotic range proteinuria.  Kidney 

biopsy per nephrology.  Pt says she will decide about dialysis after kidney bx, 

Cr is worsening.  Nephrology initiated pulse dosed steroids.  Hepatitis B and C-

, HIV negative.  Antibiotic discontinued.  Blood and urine cultures negative x48

hours.





2/16/2021.  Patient continues to have elevated creatinine and BUN.  Patient with

unknown baseline and likely has advanced CKD secondary to HTN and possible Lupus

Nephritis.  Renal US shows CKD signs and she has nephrotic range proteinuria.  

Patient has completed steroid pulse of 3 days.  C3/C4, ALISIA, ANCA, 

anti-GBM-pending.  HIV and Hep panel-negative.  Renal biopsy for today.  Ne

phrology with extensive discussion regarding hemodialysis,  risks and benefits 

of dialysis and medical complications that may arise if dialysis is not done to 

include Volume Overload, Uremia/Metabolic Encephalopathy and Death.  I 

reiterated the same risks.  Patient contemplating leaving AMA.





2/17/2021.  Patient remains uncooperative for the interview and exam.  Patient 

has not made a decision regarding initiation of hemodialysis.  Patient states 

that she will decide about hemodialysis after kidney biopsy.  Continue 

supportive care.  Follow-up laboratory as noted above





History


Interval history: 





No new issues overnight.  Patient is uncooperative with interview and exam.





Hospitalist Physical





- Constitutional


Vitals: 


                                        











Temp Pulse Resp BP Pulse Ox


 


 98.4 F   73   16   122/77   98 


 


 02/17/21 08:05  02/17/21 09:07  02/17/21 08:05  02/17/21 09:07  02/17/21 08:05











General appearance: Present: no acute distress, well-nourished





- EENT


Eyes: Present: PERRL, EOM intact


ENT: hearing intact, clear oral mucosa, dentition normal





- Neck


Neck: Present: supple, normal ROM





- Respiratory


Respiratory effort: normal


Respiratory: bilateral: CTA





- Cardiovascular


Rhythm: regular


Heart Sounds: Present: S1 & S2.  Absent: gallop, rub





- Extremities


Extremities: no ischemia, No edema, Full ROM





- Abdominal


General gastrointestinal: soft, non-tender, non-distended, normal bowel sounds





- Integumentary


Integumentary: Present: clear, warm, dry





- Neurologic


Neurologic: CNII-XII intact, moves all extremities





HEART Score





- HEART Score


EKG: Normal


Age: < 45


Risk factors: No known risk factors


Troponin: 


                                        











Troponin T  < 0.010 ng/mL (0.00-0.029)   02/12/21  01:08    











Troponin: < normal limit





Results





- Labs


CBC & Chem 7: 


                                 02/17/21 05:15





                                 02/17/21 05:15


Labs: 


                             Laboratory Last Values











WBC  18.9 K/mm3 (4.5-11.0)  H  02/17/21  05:15    


 


RBC  3.44 M/mm3 (3.65-5.03)  L  02/17/21  05:15    


 


Hgb  9.1 gm/dl (10.1-14.3)  L  02/17/21  05:15    


 


Hct  27.7 % (30.3-42.9)  L  02/17/21  05:15    


 


MCV  81 fl (79-97)   02/17/21  05:15    


 


MCH  26 pg (28-32)  L  02/17/21  05:15    


 


MCHC  33 % (30-34)   02/17/21  05:15    


 


RDW  14.2 % (13.2-15.2)   02/17/21  05:15    


 


Plt Count  242 K/mm3 (140-440)   02/17/21  05:15    


 


Lymph % (Auto)  7.3 % (13.4-35.0)  L  02/14/21  04:19    


 


Mono % (Auto)  7.7 % (0.0-7.3)  H  02/14/21  04:19    


 


Eos % (Auto)  0.1 % (0.0-4.3)   02/14/21  04:19    


 


Baso % (Auto)  0.2 % (0.0-1.8)   02/14/21  04:19    


 


Lymph # (Auto)  1.1 K/mm3 (1.2-5.4)  L  02/14/21  04:19    


 


Mono # (Auto)  1.2 K/mm3 (0.0-0.8)  H  02/14/21  04:19    


 


Eos # (Auto)  0.0 K/mm3 (0.0-0.4)   02/14/21  04:19    


 


Baso # (Auto)  0.0 K/mm3 (0.0-0.1)   02/14/21  04:19    


 


Add Manual Diff  Complete   02/17/21  05:15    


 


Total Counted  100   02/17/21  05:15    


 


Seg Neutrophils %  Np   02/17/21  05:15    


 


Seg Neuts % (Manual)  94.0 % (40.0-70.0)  H  02/17/21  05:15    


 


Lymphocytes % (Manual)  1.0 % (13.4-35.0)  L  02/17/21  05:15    


 


Monocytes % (Manual)  5.0 % (0.0-7.3)   02/17/21  05:15    


 


Nucleated RBC %  Not Reportable   02/17/21  05:15    


 


Seg Neutrophils #  12.6 K/mm3 (1.8-7.7)  H  02/14/21  04:19    


 


Seg Neutrophils # Man  17.8 K/mm3 (1.8-7.7)  H  02/17/21  05:15    


 


Band Neutrophils #  0.0 K/mm3  02/17/21  05:15    


 


Lymphocytes # (Manual)  0.2 K/mm3 (1.2-5.4)  L  02/17/21  05:15    


 


Abs React Lymphs (Man)  0.0 K/mm3  02/17/21  05:15    


 


Monocytes # (Manual)  0.9 K/mm3 (0.0-0.8)  H  02/17/21  05:15    


 


Eosinophils # (Manual)  0.0 K/mm3 (0.0-0.4)   02/17/21  05:15    


 


Basophils # (Manual)  0.0 K/mm3 (0.0-0.1)   02/17/21  05:15    


 


Metamyelocytes #  0.0 K/mm3  02/17/21  05:15    


 


Myelocytes #  0.0 K/mm3  02/17/21  05:15    


 


Promyelocytes #  0.0 K/mm3  02/17/21  05:15    


 


Blast Cells #  0.0 K/mm3  02/17/21  05:15    


 


WBC Morphology  Not Reportable   02/17/21  05:15    


 


Hypersegmented Neuts  Not Reportable   02/17/21  05:15    


 


Hyposegmented Neuts  Not Reportable   02/17/21  05:15    


 


Hypogranular Neuts  Not Reportable   02/17/21  05:15    


 


Smudge Cells  Not Reportable   02/17/21  05:15    


 


Toxic Granulation  Not Reportable   02/17/21  05:15    


 


Toxic Vacuolation  Not Reportable   02/17/21  05:15    


 


Dohle Bodies  Not Reportable   02/17/21  05:15    


 


Pelger-Huet Anomaly  Not Reportable   02/17/21  05:15    


 


Wendy Rods  Not Reportable   02/17/21  05:15    


 


Platelet Estimate  Consistent w auto   02/17/21  05:15    


 


Clumped Platelets  Not Reportable   02/17/21  05:15    


 


Plt Clumps, EDTA  Not Reportable   02/17/21  05:15    


 


Large Platelets  Not Reportable   02/17/21  05:15    


 


Giant Platelets  Not Reportable   02/17/21  05:15    


 


Platelet Satelliting  Not Reportable   02/17/21  05:15    


 


Plt Morphology Comment  Not Reportable   02/17/21  05:15    


 


RBC Morphology  Not Reportable   02/17/21  05:15    


 


Dimorphic RBCs  Not Reportable   02/17/21  05:15    


 


Polychromasia  Not Reportable   02/17/21  05:15    


 


Hypochromasia  Few   02/17/21  05:15    


 


Poikilocytosis  Not Reportable   02/17/21  05:15    


 


Anisocytosis  1+   02/17/21  05:15    


 


Microcytosis  Not Reportable   02/17/21  05:15    


 


Macrocytosis  Not Reportable   02/17/21  05:15    


 


Spherocytes  Not Reportable   02/17/21  05:15    


 


Pappenheimer Bodies  Not Reportable   02/17/21  05:15    


 


Sickle Cells  Not Reportable   02/17/21  05:15    


 


Target Cells  Not Reportable   02/17/21  05:15    


 


Tear Drop Cells  Not Reportable   02/17/21  05:15    


 


Ovalocytes  Not Reportable   02/17/21  05:15    


 


Helmet Cells  Not Reportable   02/17/21  05:15    


 


Chopra-St[place] Michael Bodies  Not Reportable   02/17/21  05:15    


 


Cabot Rings  Not Reportable   02/17/21  05:15    


 


[place] Cells  Not Reportable   02/17/21  05:15    


 


Bite Cells  Not Reportable   02/17/21  05:15    


 


Crenated Cell  Not Reportable   02/17/21  05:15    


 


Elliptocytes  Not Reportable   02/17/21  05:15    


 


Acanthocytes (Spur)  Not Reportable   02/17/21  05:15    


 


Rouleaux  Not Reportable   02/17/21  05:15    


 


Hemoglobin C Crystals  Not Reportable   02/17/21  05:15    


 


Schistocytes  Few   02/17/21  05:15    


 


Malaria parasites  Not Reportable   02/17/21  05:15    


 


Kurtis Bodies  Not Reportable   02/17/21  05:15    


 


Hem Pathologist Commnt  No   02/17/21  05:15    


 


PT  13.1 Sec. (12.2-14.9)   02/16/21  08:43    


 


INR  1.01  (0.87-1.13)   02/16/21  08:43    


 


APTT  33.6 Sec. (24.2-36.6)   02/16/21  08:43    


 


Sodium  135 mmol/L (137-145)  L  02/17/21  05:15    


 


Potassium  4.5 mmol/L (3.6-5.0)   02/17/21  05:15    


 


Chloride  101.3 mmol/L ()   02/17/21  05:15    


 


Carbon Dioxide  20 mmol/L (22-30)  L  02/17/21  05:15    


 


Anion Gap  18 mmol/L  02/17/21  05:15    


 


BUN  63 mg/dL (7-17)  H  02/17/21  05:15    


 


Creatinine  8.0 mg/dL (0.6-1.2)  H  02/17/21  05:15    


 


Estimated GFR  8 ml/min  02/17/21  05:15    


 


BUN/Creatinine Ratio  8 %  02/17/21  05:15    


 


Glucose  135 mg/dL ()  H  02/17/21  05:15    


 


Uric Acid  7.1 mg/dL (3.5-7.6)   02/12/21  14:10    


 


Calcium  8.1 mg/dL (8.4-10.2)  L  02/17/21  05:15    


 


Phosphorus  5.20 mg/dL (2.5-4.5)  H  02/17/21  05:15    


 


Iron  19 ug/dL ()  L  02/13/21  04:40    


 


TIBC  176 mcg/dL (250-450)  L  02/13/21  04:40    


 


Ferritin  130.0 ng/mL (10.0-200.0)   02/13/21  04:40    


 


Lactate Dehydrogenase  296 units/L ()  H  02/12/21  10:15    


 


Total Creatine Kinase  152 units/L ()  H  02/12/21  10:15    


 


Troponin T  < 0.010 ng/mL (0.00-0.029)   02/12/21  01:08    


 


Lipase  71 units/L (13-60)  H  02/11/21  23:09    


 


PTH Intact  359.1 pg/mL (15-65)  H  02/13/21  16:10    


 


Urine Color  Colorless  (Yellow)   02/12/21  01:10    


 


Urine Turbidity  Clear  (Clear)   02/12/21  01:10    


 


Urine pH  7.0  (5.0-7.0)   02/12/21  01:10    


 


Ur Specific Gravity  1.007  (1.003-1.030)   02/12/21  01:10    


 


Urine Protein  >500 mg/dL (Negative)   02/12/21  01:10    


 


Urine Glucose (UA)  50 mg/dL (Negative)   02/12/21  01:10    


 


Urine Ketones  Neg mg/dL (Negative)   02/12/21  01:10    


 


Urine Blood  Sm  (Negative)   02/12/21  01:10    


 


Urine Nitrite  Neg  (Negative)   02/12/21  01:10    


 


Urine Bilirubin  Neg  (Negative)   02/12/21  01:10    


 


Urine Urobilinogen  < 2.0 mg/dL (<2.0)   02/12/21  01:10    


 


Ur Leukocyte Esterase  Neg  (Negative)   02/12/21  01:10    


 


Urine WBC (Auto)  4.0 /HPF (0.0-6.0)   02/12/21  01:10    


 


Urine RBC (Auto)  15.0 /HPF (0.0-6.0)   02/12/21  01:10    


 


U Epithel Cells (Auto)  < 1.0 /HPF (0-13.0)   02/12/21  01:10    


 


Hyaline Casts  1 /LPF  02/11/21  22:00    


 


Urine Mucus  Few /HPF  02/12/21  01:10    


 


Urine Eosinophils  Rare eosinophil seen  (None Seen)   02/12/21  15:47    


 


Urine Creatinine  45.6 mg/dL (0.1-20.0)  H  02/12/21  15:38    


 


Urine Creatinine  46.0 mg/dL (0.1-20.0)  H  02/12/21  15:38    


 


Protein/Creatinin Ratio  5.04   02/12/21  15:38    


 


Urine Sodium  104 mmol/L  02/12/21  15:38    


 


Urine Total Protein  230 mg/dL (5-11.8)  H  02/12/21  15:38    


 


Urine HCG, Qual  Negative  (Negative)   02/11/21  22:00    


 


Complement C3  118 mg/dL ()   02/12/21  10:15    


 


Complement C4  24 mg/dL (15-57)   02/12/21  10:15    


 


Coronavirus (PCR)  Negative  (Negative)   02/13/21  10:15    


 


Hep Bs Antigen  Non-reactive  (Negative)   02/12/21  14:10    


 


Hepatitis C Antibody  Non-reactive  (NonReactive)   02/12/21  14:10    


 


HIV 1&2 Antibody Rapid  Non react  (Non React)   02/12/21  10:15    


 


HIV P24 Antigen  Non react  (Non React)   02/12/21  10:15    


 


Schistocytes Smear  Rare   02/12/21  10:15    











Microbiology: 


Microbiology





02/12/21 14:10   Peripheral/Venous   Blood Culture - Preliminary


                            NO GROWTH AFTER 4 DAYS


02/12/21 14:24   Peripheral/Venous   Blood Culture - Preliminary


                            NO GROWTH AFTER 4 DAYS











- Diagnostic Impressions


Diagnostic Impressions: 








Echocardiogram  02/12/21 01:20


Transthoracic Echocardiogram


 


Indication:


Chest pain


BP:           163/113


HR:           89


 


Conclusions


 *Unremarkable echo-doppler study.


 


Findings     


Left Ventricle:


The left ventricular chamber size is normal. Global left ventricular


wall motion and contractility are within normal limits. Global left


ventricular systolic function is normal. The estimated ejection fraction


is 55-60%.  Normal left ventricular diastolic filling is observed. 


 


Left Atrium:


The left atrial chamber size is normal. 


 


Right Ventricle:


The right ventricular cavity size is normal. 


 


Right Atrium:


The right atrial cavity size is normal. 


 


Aortic Valve:


The aortic valve structure is normal. There is no evidence of aortic


regurgitation. 


 


Mitral Valve:


The mitral valve leaflets are mildly thickened. There is no evidence of


mitral regurgitation. 


 


Tricuspid Valve:


The tricuspid valve leaflets are normal.  There is mild tricuspid


regurgitation. The right ventricular systolic pressure is calculated at


28 mmHg.  


 


Pulmonic Valve:


The pulmonic valve appears normal. There is trace pulmonic


regurgitation.  


 


Pericardium:


There is no pericardial effusion. 


 


Aorta:


The aorta appears normal.  


 


Venous:


The inferior vena cava appears normal. 


 


Measurements     


Chambers 2D


Name                    Value         Normal Range            


IVSd (2D)               0.96 cm       (0.6 - 1.1)             


LVPWd (2D)              0.9 cm        (0.6 - 1.1)             


LVIDd (2D)              4.59 cm       (3.7 - 5.6)             


LVIDs (2D)              2.99 cm       (2 - 3.8)               


LV FS (2D)              34.92 %       -                        


EF Teichholz (2D)       64.24 %       -                        


Ao root diameter (2D)   2.62 cm       (2 - 3.7)               


 


Volumes/Mass


Name                    Value         Normal Range            


LA ESV SP 4CH (A/L)     14.15 ml      -                        


LA ESV SP 2CH (A/L)     23.9 ml       -                        


LA ESV BP (A/L)         18.76 ml      -                        


LA ESV BP (A/L) index   12.03 ml/m2   -                        


LA ESV SP 4CH (MOD)     12.91 ml      -                        


LA ESV SP 2CH (MOD)     24.49 ml      -                        


LA ESV BP (MOD)         17.61 ml      -                        


LA ESV BP (MOD) index   11.29 ml/m2   -                        


 


Diastolic/Systolic Function


Name                    Value         Normal Range            


MV E-wave Vmax          0.88 m/sec    -                        


MV deceleration time    164.26 msec   -                        


MV A-wave Vmax          0.75 m/sec    -                        


MV E:A ratio            1.18 ratio    -                        


 


Aortic Valve


Name                    Value         Normal Range            


AV Vmax                 1.23 m/sec    -                        


AV VTI                  22.05 cm      -                        


AV peak gradient        6.04 mmHg     -                        


AV mean gradient        3.49 mmHg     -                        


LVOT diameter           2.05 cm       -                        


LVOT Vmax               1.01 m/sec    -                        


LVOT VTI                17.84 cm      -                        


LVOT peak gradient      4.1 mmHg      -                        


LVOT mean gradient      2.15 mmHg     -                        


SV LVOT                 59.03 ml      -                        


JONATHAN (continuity Vmax)   2.73 cm2      -                        


JONATHAN (continuity VTI)    2.68 cm2      -                        


Ascending Ao            2.64 cm       -                        


 


Tricuspid Valve


Name                    Value         Normal Range            


TR Vmax                 2.51 m/sec    -                        


TR peak gradient        25 mmHg       -                        


RAP                     3 mmHg        -                        


RVSP                    28 mmHg       -                        


IVC diameter            2.07 cm       (1.2 - 2.3)             


 


Pulmonic Valve/Qp:Qs


Name                    Value         Normal Range            


PV Vmax                 0.9 m/sec     -                        


PV peak gradient        3.24 mmHg     -                        


CO end-diastolic Vmax   0.92 m/sec    -                        


PV acceleration time    91.34 msec    -                        


 


Electronically signed by: Cayetano Gonzalez MD on 02/12/2021


10:32:02











Banks/IV: 


                                        





Voiding Method                   Toilet











Active Medications





- Current Medications


Current Medications: 














Generic Name Dose Route Start Last Admin





  Trade Name Freq  PRN Reason Stop Dose Admin


 


Acetaminophen  650 mg  02/12/21 01:07  02/13/21 04:30





  Acetaminophen 325 Mg Tab  PO   650 mg





  Q4H PRN   Administration





  Pain MILD(1-3)/Fever >100.5/HA  


 


Amlodipine Besylate  10 mg  02/12/21 12:00  02/17/21 09:07





  Amlodipine 10 Mg Tab  PO   10 mg





  QDAY KETTY   Administration


 


Atorvastatin Calcium  20 mg  02/12/21 22:00  02/16/21 22:58





  Atorvastatin 20 Mg Tab  PO   20 mg





  QHS KETTY   Administration


 


Calcium Carbonate/Glycine  1,250 mg  02/12/21 12:00  02/17/21 09:08





  Calcium Carbonate 1250 Mg Tab  PO   1,250 mg





  BID KETTY   Administration


 


Famotidine  10 mg  02/12/21 10:00  02/17/21 09:08





  Famotidine 10 Mg Tab  PO   10 mg





  BID KETTY   Administration


 


Fluticasone Propionate  50 mcg  02/12/21 10:00  02/17/21 09:07





  Fluticasone Propionate Nasal Spray 16 Gm  NS   50 mcg





  QDAY KETTY   Administration


 


Heparin Sodium (Porcine)  5,000 unit  02/12/21 06:00  02/17/21 05:26





  Heparin 5,000 Unit/1 Ml Vial  SUB-Q   Not Given





  Q8HR KETTY  


 


Hydralazine HCl  10 mg  02/12/21 05:12  02/13/21 04:30





  Hydralazine 20 Mg/1 Ml Inj  IV   10 mg





  Q6HR PRN   Administration





  Blood Pressure  


 


Hydromorphone HCl  2 mg  02/17/21 10:13 





  Hydromorphone 2 Mg/1 Ml Inj  IV  02/17/21 10:14 





  ONCE ONE  


 


Hydroxychloroquine Sulfate  200 mg  02/13/21 10:00  02/17/21 09:07





  Hydroxychloroquine 200 Mg Tab  PO   200 mg





  QDAY KETTY   Administration


 


Sodium Chloride  500 mls @ 50 mls/hr  02/16/21 11:00 





  Nacl 0.9% 500 Ml  IV  





  AS DIRECT KETTY  


 


Morphine Sulfate  2 mg  02/12/21 01:07  02/15/21 00:49





  Morphine 2 Mg/1 Ml Inj  IV   2 mg





  Q4H PRN   Administration





  Pain, Moderate (4-6)  


 


Ondansetron HCl  4 mg  02/12/21 01:07  02/12/21 15:29





  Ondansetron 4 Mg/2 Ml Inj  IV   4 mg





  Q8H PRN   Administration





  Nausea And Vomiting  


 


Ondansetron HCl  4 mg  02/17/21 10:13 





  Ondansetron 4 Mg/2 Ml Inj  IV  02/17/21 10:14 





  ONCE ONE  


 


Sodium Chloride  10 ml  02/12/21 10:00  02/17/21 09:08





  Sodium Chloride 0.9% 10 Ml Flush Syringe  IV   10 ml





  BID KETTY   Administration


 


Sodium Chloride  10 ml  02/12/21 01:07  02/13/21 04:30





  Sodium Chloride 0.9% 10 Ml Flush Syringe  IV   10 ml





  PRN PRN   Administration





  LINE FLUSH

## 2021-02-17 NOTE — CAT SCAN REPORT
CT-GUIDED RENAL BIOPSY



INDICATION : Severe renal failure.



COMPARISON:  None



PROCEDURE:  The risks (including but not limited to bleeding and infection) and benefits were explain
ed to the patient and informed consent was obtained. The patient appeared somewhat apprehensive about
 having this procedure done. I spent at least 30 minutes discussing this procedure with the patient i
ncluding possible complications including postbiopsy bleeding, hematuria, infection and sedation risk
s. All CT scans at this location are performed using CT dose reduction for ALARA by means of automate
d exposure control. 



A time out procedure was performed.  The procedure site was prepped and draped in the usual sterile f
ashion and lidocaine was used for local anesthesia. Anxiolysis was accomplished with 1.5 mg of IV Adenike
sed and 2 mg of IV Zofran. Blood pressure control was also obtained with 10 mg of IV hydralazine.



Using CT guidance, a 17-gauge introducer needle was advanced to the inferior pole of the left kidney.
 2 separate 1.3 cm 18-gauge core biopsies were obtained for pathologic analysis. Postbiopsy scan demo
nstrates only trace hemorrhage at the biopsy site. No uncontained hemorrhage.



The patient tolerated the procedure well with no complications.



IMPRESSION: Successful CT-guided biopsy at the inferior pole of the left kidney.



Signer Name: Doug Verdin Jr, MD 

Signed: 2/17/2021 1:08 PM

Workstation Name: [id number]

## 2021-02-18 LAB
BASOPHILS # (AUTO): 0 K/MM3 (ref 0–0.1)
BASOPHILS NFR BLD AUTO: 0 % (ref 0–1.8)
BUN SERPL-MCNC: 68 MG/DL (ref 7–17)
BUN/CREAT SERPL: 9 %
CALCIUM SERPL-MCNC: 8 MG/DL (ref 8.4–10.2)
EOSINOPHIL # BLD AUTO: 0 K/MM3 (ref 0–0.4)
EOSINOPHIL NFR BLD AUTO: 0 % (ref 0–4.3)
HCT VFR BLD CALC: 27.6 % (ref 30.3–42.9)
HEMOLYSIS INDEX: 2
HGB BLD-MCNC: 9.1 GM/DL (ref 10.1–14.3)
LYMPHOCYTES # BLD AUTO: 0.3 K/MM3 (ref 1.2–5.4)
LYMPHOCYTES NFR BLD AUTO: 1.8 % (ref 13.4–35)
MCHC RBC AUTO-ENTMCNC: 33 % (ref 30–34)
MCV RBC AUTO: 81 FL (ref 79–97)
MONOCYTES # (AUTO): 2 K/MM3 (ref 0–0.8)
MONOCYTES % (AUTO): 10.7 % (ref 0–7.3)
PLATELET # BLD: 234 K/MM3 (ref 140–440)
RBC # BLD AUTO: 3.39 M/MM3 (ref 3.65–5.03)

## 2021-02-18 PROCEDURE — 5A1D70Z PERFORMANCE OF URINARY FILTRATION, INTERMITTENT, LESS THAN 6 HOURS PER DAY: ICD-10-PCS | Performed by: HOSPITALIST

## 2021-02-18 PROCEDURE — B543ZZA ULTRASONOGRAPHY OF RIGHT JUGULAR VEINS, GUIDANCE: ICD-10-PCS | Performed by: RADIOLOGY

## 2021-02-18 PROCEDURE — B5181ZA FLUOROSCOPY OF SUPERIOR VENA CAVA USING LOW OSMOLAR CONTRAST, GUIDANCE: ICD-10-PCS | Performed by: RADIOLOGY

## 2021-02-18 PROCEDURE — 0JH63XZ INSERTION OF TUNNELED VASCULAR ACCESS DEVICE INTO CHEST SUBCUTANEOUS TISSUE AND FASCIA, PERCUTANEOUS APPROACH: ICD-10-PCS | Performed by: RADIOLOGY

## 2021-02-18 PROCEDURE — 02H633Z INSERTION OF INFUSION DEVICE INTO RIGHT ATRIUM, PERCUTANEOUS APPROACH: ICD-10-PCS | Performed by: RADIOLOGY

## 2021-02-18 RX ADMIN — CALCIUM SCH: 500 TABLET ORAL at 13:13

## 2021-02-18 RX ADMIN — LIDOCAINE HYDROCHLORIDE ONE ML: 20 INJECTION, SOLUTION INFILTRATION; PERINEURAL at 11:57

## 2021-02-18 RX ADMIN — MIDAZOLAM ONE MG: 1 INJECTION INTRAMUSCULAR; INTRAVENOUS at 11:42

## 2021-02-18 RX ADMIN — MIDAZOLAM ONE MG: 1 INJECTION INTRAMUSCULAR; INTRAVENOUS at 11:47

## 2021-02-18 RX ADMIN — FAMOTIDINE SCH MG: 10 TABLET ORAL at 22:06

## 2021-02-18 RX ADMIN — BENZONATATE SCH MG: 100 CAPSULE ORAL at 22:06

## 2021-02-18 RX ADMIN — FENTANYL CITRATE ONE MCG: 50 INJECTION, SOLUTION INTRAMUSCULAR; INTRAVENOUS at 11:53

## 2021-02-18 RX ADMIN — FLUTICASONE PROPIONATE SCH MCG: 50 SPRAY, METERED NASAL at 13:12

## 2021-02-18 RX ADMIN — Medication SCH ML: at 13:13

## 2021-02-18 RX ADMIN — CALCIUM SCH MG: 500 TABLET ORAL at 22:06

## 2021-02-18 RX ADMIN — HEPARIN SODIUM SCH: 5000 INJECTION, SOLUTION INTRAVENOUS; SUBCUTANEOUS at 17:05

## 2021-02-18 RX ADMIN — LIDOCAINE HYDROCHLORIDE ONE ML: 20 INJECTION, SOLUTION INFILTRATION; PERINEURAL at 11:54

## 2021-02-18 RX ADMIN — HEPARIN SODIUM SCH UNIT: 5000 INJECTION, SOLUTION INTRAVENOUS; SUBCUTANEOUS at 22:07

## 2021-02-18 RX ADMIN — ACETAMINOPHEN PRN MG: 325 TABLET ORAL at 18:17

## 2021-02-18 RX ADMIN — FAMOTIDINE SCH: 10 TABLET ORAL at 13:13

## 2021-02-18 RX ADMIN — MIDAZOLAM ONE MG: 1 INJECTION INTRAMUSCULAR; INTRAVENOUS at 11:53

## 2021-02-18 RX ADMIN — HYDROXYCHLOROQUINE SULFATE SCH: 200 TABLET ORAL at 13:13

## 2021-02-18 RX ADMIN — FENTANYL CITRATE ONE MCG: 50 INJECTION, SOLUTION INTRAMUSCULAR; INTRAVENOUS at 11:49

## 2021-02-18 RX ADMIN — HEPARIN SODIUM SCH: 5000 INJECTION, SOLUTION INTRAVENOUS; SUBCUTANEOUS at 08:23

## 2021-02-18 RX ADMIN — Medication SCH ML: at 22:07

## 2021-02-18 NOTE — PROGRESS NOTE
Assessment and Plan


Assessment and plan: 





Acute kidney failure, lupus nephritis





Back pain





Chest pain





sepsis


Continue empiric Rocephin 1 g IV daily.  Patient is also on Diflucan.





Headache


Tylenol 650 p.o. every 6 hours as needed.  Morphine 1 to 2 mg IV every 4 hours 

as needed.











2/13/2021


-Patient is septic evidenced by leukocytosis and fever.  Patient is on IV Zosyn.

 UA is negative


-Patient has acute renal failure versus acute on chronic renal failure, with GFR

of 10, nephrology is following.  Patient has lupus and likely due to lupus 

nephritis


-Pain is likely due to lupus, I placed the patient on steroids and 

hydroxychloroquine


-Pain control





2/14/2021


-Patient is still leukocytosis [but trending down] but no fever over the last 24

hours.  Blood and urine cultures are negative.  Fever is likely due to lupus.  I

will stop the IV antibiotics


-Patient has acute renal failure and creatinine is worsening overnight increased

to 7.3.  Nephrology is following and planning kidney biopsy and possible 

dialysis.


-I put the patient on hydroxychloroquine and prednisone.





2/15/2021.  Patient with unknown baseline creatinine/renal function.  Patient 

likely has advanced CKD secondary to hypertension and possible lupus nephritis. 

 Renal US shows CKD signs and she has nephrotic range proteinuria.  Kidney 

biopsy per nephrology.  Pt says she will decide about dialysis after kidney bx, 

Cr is worsening.  Nephrology initiated pulse dosed steroids.  Hepatitis B and C-

, HIV negative.  Antibiotic discontinued.  Blood and urine cultures negative x48

hours.





2/16/2021.  Patient continues to have elevated creatinine and BUN.  Patient with

unknown baseline and likely has advanced CKD secondary to HTN and possible Lupus

Nephritis.  Renal US shows CKD signs and she has nephrotic range proteinuria.  

Patient has completed steroid pulse of 3 days.  C3/C4, ALISIA, ANCA, 

anti-GBM-pending.  HIV and Hep panel-negative.  Renal biopsy for today.  Ne

phrology with extensive discussion regarding hemodialysis,  risks and benefits 

of dialysis and medical complications that may arise if dialysis is not done to 

include Volume Overload, Uremia/Metabolic Encephalopathy and Death.  I 

reiterated the same risks.  Patient contemplating leaving AMA.





2/17/2021.  Patient remains uncooperative for the interview and exam.  Patient 

has not made a decision regarding initiation of hemodialysis.  Patient states 

that she will decide about hemodialysis after kidney biopsy.  Continue 

supportive care.  Follow-up laboratory as noted above





2/18/2021. Patient has decided to proceed with hemodialysis. Perm-Cath placement

per vascular surgery today and subsequent initiation of hemodialysis. Patient is

s/p methylprednisolone steroid x3 days.  C3/C4, ALISIA, ANCA, anti-GBM-pending.  

HIV and Hep panel-negative.  Case management onboard for outpatient HD 

arrangement to [facility]





History


Interval history: 





No new issues overnight.  





Hospitalist Physical





- Constitutional


Vitals: 


                                        











Temp Pulse Resp BP Pulse Ox


 


 98.7 F   80   18   129/81   96 


 


 02/18/21 08:04  02/18/21 08:04  02/18/21 08:04  02/18/21 08:04  02/18/21 08:04











General appearance: Present: no acute distress, well-nourished





- EENT


Eyes: Present: PERRL, EOM intact


ENT: hearing intact, clear oral mucosa, dentition normal





- Neck


Neck: Present: supple, normal ROM





- Respiratory


Respiratory effort: normal


Respiratory: bilateral: CTA





- Cardiovascular


Rhythm: regular


Heart Sounds: Present: S1 & S2.  Absent: gallop, rub





- Extremities


Extremities: no ischemia, No edema, Full ROM





- Abdominal


General gastrointestinal: soft, non-tender, non-distended, normal bowel sounds





- Integumentary


Integumentary: Present: clear, warm, dry





- Neurologic


Neurologic: CNII-XII intact, moves all extremities





HEART Score





- HEART Score


EKG: Normal


Age: < 45


Risk factors: No known risk factors


Troponin: 


                                        











Troponin T  < 0.010 ng/mL (0.00-0.029)   02/12/21  01:08    











Troponin: < normal limit





Results





- Labs


CBC & Chem 7: 


                                 02/18/21 05:24





                                 02/18/21 05:24


Labs: 


                             Laboratory Last Values











WBC  19.1 K/mm3 (4.5-11.0)  H  02/18/21  05:24    


 


RBC  3.39 M/mm3 (3.65-5.03)  L  02/18/21  05:24    


 


Hgb  9.1 gm/dl (10.1-14.3)  L  02/18/21  05:24    


 


Hct  27.6 % (30.3-42.9)  L  02/18/21  05:24    


 


MCV  81 fl (79-97)   02/18/21  05:24    


 


MCH  27 pg (28-32)  L  02/18/21  05:24    


 


MCHC  33 % (30-34)   02/18/21  05:24    


 


RDW  14.1 % (13.2-15.2)   02/18/21  05:24    


 


Plt Count  234 K/mm3 (140-440)   02/18/21  05:24    


 


Lymph % (Auto)  1.8 % (13.4-35.0)  L  02/18/21  05:24    


 


Mono % (Auto)  10.7 % (0.0-7.3)  H  02/18/21  05:24    


 


Eos % (Auto)  0.0 % (0.0-4.3)   02/18/21  05:24    


 


Baso % (Auto)  0.0 % (0.0-1.8)   02/18/21  05:24    


 


Lymph # (Auto)  0.3 K/mm3 (1.2-5.4)  L  02/18/21  05:24    


 


Mono # (Auto)  2.0 K/mm3 (0.0-0.8)  H  02/18/21  05:24    


 


Eos # (Auto)  0.0 K/mm3 (0.0-0.4)   02/18/21  05:24    


 


Baso # (Auto)  0.0 K/mm3 (0.0-0.1)   02/18/21  05:24    


 


Add Manual Diff  Complete   02/17/21  05:15    


 


Total Counted  100   02/17/21  05:15    


 


Seg Neutrophils %  87.5 % (40.0-70.0)  H  02/18/21  05:24    


 


Seg Neuts % (Manual)  94.0 % (40.0-70.0)  H  02/17/21  05:15    


 


Lymphocytes % (Manual)  1.0 % (13.4-35.0)  L  02/17/21  05:15    


 


Monocytes % (Manual)  5.0 % (0.0-7.3)   02/17/21  05:15    


 


Nucleated RBC %  Not Reportable   02/17/21  05:15    


 


Seg Neutrophils #  16.7 K/mm3 (1.8-7.7)  H  02/18/21  05:24    


 


Seg Neutrophils # Man  17.8 K/mm3 (1.8-7.7)  H  02/17/21  05:15    


 


Band Neutrophils #  0.0 K/mm3  02/17/21  05:15    


 


Lymphocytes # (Manual)  0.2 K/mm3 (1.2-5.4)  L  02/17/21  05:15    


 


Abs React Lymphs (Man)  0.0 K/mm3  02/17/21  05:15    


 


Monocytes # (Manual)  0.9 K/mm3 (0.0-0.8)  H  02/17/21  05:15    


 


Eosinophils # (Manual)  0.0 K/mm3 (0.0-0.4)   02/17/21  05:15    


 


Basophils # (Manual)  0.0 K/mm3 (0.0-0.1)   02/17/21  05:15    


 


Metamyelocytes #  0.0 K/mm3  02/17/21  05:15    


 


Myelocytes #  0.0 K/mm3  02/17/21  05:15    


 


Promyelocytes #  0.0 K/mm3  02/17/21  05:15    


 


Blast Cells #  0.0 K/mm3  02/17/21  05:15    


 


WBC Morphology  Not Reportable   02/17/21  05:15    


 


Hypersegmented Neuts  Not Reportable   02/17/21  05:15    


 


Hyposegmented Neuts  Not Reportable   02/17/21  05:15    


 


Hypogranular Neuts  Not Reportable   02/17/21  05:15    


 


Smudge Cells  Not Reportable   02/17/21  05:15    


 


Toxic Granulation  Not Reportable   02/17/21  05:15    


 


Toxic Vacuolation  Not Reportable   02/17/21  05:15    


 


Dohle Bodies  Not Reportable   02/17/21  05:15    


 


Pelger-Huet Anomaly  Not Reportable   02/17/21  05:15    


 


Wendy Rods  Not Reportable   02/17/21  05:15    


 


Platelet Estimate  Consistent w auto   02/17/21  05:15    


 


Clumped Platelets  Not Reportable   02/17/21  05:15    


 


Plt Clumps, EDTA  Not Reportable   02/17/21  05:15    


 


Large Platelets  Not Reportable   02/17/21  05:15    


 


Giant Platelets  Not Reportable   02/17/21  05:15    


 


Platelet Satelliting  Not Reportable   02/17/21  05:15    


 


Plt Morphology Comment  Not Reportable   02/17/21  05:15    


 


RBC Morphology  Not Reportable   02/17/21  05:15    


 


Dimorphic RBCs  Not Reportable   02/17/21  05:15    


 


Polychromasia  Not Reportable   02/17/21  05:15    


 


Hypochromasia  Few   02/17/21  05:15    


 


Poikilocytosis  Not Reportable   02/17/21  05:15    


 


Anisocytosis  1+   02/17/21  05:15    


 


Microcytosis  Not Reportable   02/17/21  05:15    


 


Macrocytosis  Not Reportable   02/17/21  05:15    


 


Spherocytes  Not Reportable   02/17/21  05:15    


 


Pappenheimer Bodies  Not Reportable   02/17/21  05:15    


 


Sickle Cells  Not Reportable   02/17/21  05:15    


 


Target Cells  Not Reportable   02/17/21  05:15    


 


Tear Drop Cells  Not Reportable   02/17/21  05:15    


 


Ovalocytes  Not Reportable   02/17/21  05:15    


 


Helmet Cells  Not Reportable   02/17/21  05:15    


 


Chopra-Cooter Bodies  Not Reportable   02/17/21  05:15    


 


Cabot Rings  Not Reportable   02/17/21  05:15    


 


Iker Cells  Not Reportable   02/17/21  05:15    


 


Bite Cells  Not Reportable   02/17/21  05:15    


 


Crenated Cell  Not Reportable   02/17/21  05:15    


 


Elliptocytes  Not Reportable   02/17/21  05:15    


 


Acanthocytes (Spur)  Not Reportable   02/17/21  05:15    


 


Rouleaux  Not Reportable   02/17/21  05:15    


 


Hemoglobin C Crystals  Not Reportable   02/17/21  05:15    


 


Schistocytes  Few   02/17/21  05:15    


 


Malaria parasites  Not Reportable   02/17/21  05:15    


 


Kurtis Bodies  Not Reportable   02/17/21  05:15    


 


Hem Pathologist Commnt  No   02/17/21  05:15    


 


PT  13.1 Sec. (12.2-14.9)   02/16/21  08:43    


 


INR  1.01  (0.87-1.13)   02/16/21  08:43    


 


APTT  33.6 Sec. (24.2-36.6)   02/16/21  08:43    


 


Sodium  134 mmol/L (137-145)  L  02/18/21  05:24    


 


Potassium  4.3 mmol/L (3.6-5.0)   02/18/21  05:24    


 


Chloride  100.3 mmol/L ()   02/18/21  05:24    


 


Carbon Dioxide  20 mmol/L (22-30)  L  02/18/21  05:24    


 


Anion Gap  18 mmol/L  02/18/21  05:24    


 


BUN  68 mg/dL (7-17)  H  02/18/21  05:24    


 


Creatinine  7.5 mg/dL (0.6-1.2)  H  02/18/21  05:24    


 


Estimated GFR  8 ml/min  02/18/21  05:24    


 


BUN/Creatinine Ratio  9 %  02/18/21  05:24    


 


Glucose  105 mg/dL ()  H  02/18/21  05:24    


 


Uric Acid  7.1 mg/dL (3.5-7.6)   02/12/21  14:10    


 


Calcium  8.0 mg/dL (8.4-10.2)  L  02/18/21  05:24    


 


Phosphorus  5.50 mg/dL (2.5-4.5)  H  02/18/21  05:24    


 


Iron  19 ug/dL ()  L  02/13/21  04:40    


 


TIBC  176 mcg/dL (250-450)  L  02/13/21  04:40    


 


Ferritin  130.0 ng/mL (10.0-200.0)   02/13/21  04:40    


 


Lactate Dehydrogenase  296 units/L ()  H  02/12/21  10:15    


 


Total Creatine Kinase  152 units/L ()  H  02/12/21  10:15    


 


Troponin T  < 0.010 ng/mL (0.00-0.029)   02/12/21  01:08    


 


Lipase  71 units/L (13-60)  H  02/11/21  23:09    


 


PTH Intact  359.1 pg/mL (15-65)  H  02/13/21  16:10    


 


Urine Color  Colorless  (Yellow)   02/12/21  01:10    


 


Urine Turbidity  Clear  (Clear)   02/12/21  01:10    


 


Urine pH  7.0  (5.0-7.0)   02/12/21  01:10    


 


Ur Specific Gravity  1.007  (1.003-1.030)   02/12/21  01:10    


 


Urine Protein  >500 mg/dL (Negative)   02/12/21  01:10    


 


Urine Glucose (UA)  50 mg/dL (Negative)   02/12/21  01:10    


 


Urine Ketones  Neg mg/dL (Negative)   02/12/21  01:10    


 


Urine Blood  Sm  (Negative)   02/12/21  01:10    


 


Urine Nitrite  Neg  (Negative)   02/12/21  01:10    


 


Urine Bilirubin  Neg  (Negative)   02/12/21  01:10    


 


Urine Urobilinogen  < 2.0 mg/dL (<2.0)   02/12/21  01:10    


 


Ur Leukocyte Esterase  Neg  (Negative)   02/12/21  01:10    


 


Urine WBC (Auto)  4.0 /HPF (0.0-6.0)   02/12/21  01:10    


 


Urine RBC (Auto)  15.0 /HPF (0.0-6.0)   02/12/21  01:10    


 


U Epithel Cells (Auto)  < 1.0 /HPF (0-13.0)   02/12/21  01:10    


 


Hyaline Casts  1 /LPF  02/11/21  22:00    


 


Urine Mucus  Few /HPF  02/12/21  01:10    


 


Urine Eosinophils  Rare eosinophil seen  (None Seen)   02/12/21  15:47    


 


Urine Creatinine  45.6 mg/dL (0.1-20.0)  H  02/12/21  15:38    


 


Urine Creatinine  46.0 mg/dL (0.1-20.0)  H  02/12/21  15:38    


 


Protein/Creatinin Ratio  5.04   02/12/21  15:38    


 


Urine Sodium  104 mmol/L  02/12/21  15:38    


 


Urine Total Protein  230 mg/dL (5-11.8)  H  02/12/21  15:38    


 


Urine HCG, Qual  Negative  (Negative)   02/11/21  22:00    


 


Proteinase 3 (PR3) Ab  <1.0 AI (<1.0)   02/12/21  14:10    


 


Myeloperoxidase Ab  <1.0 AI (<1.0)   02/12/21  14:10    


 


Complement C3  120 mg/dL ()   02/13/21  16:10    


 


Complement C4  25 mg/dL (15-57)   02/13/21  16:10    


 


Coronavirus (PCR)  Negative  (Negative)   02/13/21  10:15    


 


Hep Bs Antigen  Non-reactive  (Negative)   02/12/21  14:10    


 


Hepatitis C Antibody  Non-reactive  (NonReactive)   02/12/21  14:10    


 


HIV 1&2 Antibody Rapid  Non react  (Non React)   02/12/21  10:15    


 


HIV P24 Antigen  Non react  (Non React)   02/12/21  10:15    


 


Schistocytes Smear  Rare   02/12/21  10:15    











Microbiology: 


Microbiology





02/12/21 14:10   Peripheral/Venous   Blood Culture - Final


                            NO GROWTH AFTER 5 DAYS


02/12/21 14:24   Peripheral/Venous   Blood Culture - Final


                            NO GROWTH AFTER 5 DAYS











- Diagnostic Impressions


Diagnostic Impressions: 








Echocardiogram  02/12/21 01:20


Transthoracic Echocardiogram


 


Indication:


Chest pain


BP:           163/113


HR:           89


 


Conclusions


 *Unremarkable echo-doppler study.


 


Findings     


Left Ventricle:


The left ventricular chamber size is normal. Global left ventricular


wall motion and contractility are within normal limits. Global left


ventricular systolic function is normal. The estimated ejection fraction


is 55-60%.  Normal left ventricular diastolic filling is observed. 


 


Left Atrium:


The left atrial chamber size is normal. 


 


Right Ventricle:


The right ventricular cavity size is normal. 


 


Right Atrium:


The right atrial cavity size is normal. 


 


Aortic Valve:


The aortic valve structure is normal. There is no evidence of aortic


regurgitation. 


 


Mitral Valve:


The mitral valve leaflets are mildly thickened. There is no evidence of


mitral regurgitation. 


 


Tricuspid Valve:


The tricuspid valve leaflets are normal.  There is mild tricuspid


regurgitation. The right ventricular systolic pressure is calculated at


28 mmHg.  


 


Pulmonic Valve:


The pulmonic valve appears normal. There is trace pulmonic


regurgitation.  


 


Pericardium:


There is no pericardial effusion. 


 


Aorta:


The aorta appears normal.  


 


Venous:


The inferior vena cava appears normal. 


 


Measurements     


Chambers 2D


Name                    Value         Normal Range            


IVSd (2D)               0.96 cm       (0.6 - 1.1)             


LVPWd (2D)              0.9 cm        (0.6 - 1.1)             


LVIDd (2D)              4.59 cm       (3.7 - 5.6)             


LVIDs (2D)              2.99 cm       (2 - 3.8)               


LV FS (2D)              34.92 %       -                        


EF Teichholz (2D)       64.24 %       -                        


Ao root diameter (2D)   2.62 cm       (2 - 3.7)               


 


Volumes/Mass


Name                    Value         Normal Range            


LA ESV SP 4CH (A/L)     14.15 ml      -                        


LA ESV SP 2CH (A/L)     23.9 ml       -                        


LA ESV BP (A/L)         18.76 ml      -                        


LA ESV BP (A/L) index   12.03 ml/m2   -                        


LA ESV SP 4CH (MOD)     12.91 ml      -                        


LA ESV SP 2CH (MOD)     24.49 ml      -                        


LA ESV BP (MOD)         17.61 ml      -                        


LA ESV BP (MOD) index   11.29 ml/m2   -                        


 


Diastolic/Systolic Function


Name                    Value         Normal Range            


MV E-wave Vmax          0.88 m/sec    -                        


MV deceleration time    164.26 msec   -                        


MV A-wave Vmax          0.75 m/sec    -                        


MV E:A ratio            1.18 ratio    -                        


 


Aortic Valve


Name                    Value         Normal Range            


AV Vmax                 1.23 m/sec    -                        


AV VTI                  22.05 cm      -                        


AV peak gradient        6.04 mmHg     -                        


AV mean gradient        3.49 mmHg     -                        


LVOT diameter           2.05 cm       -                        


LVOT Vmax               1.01 m/sec    -                        


LVOT VTI                17.84 cm      -                        


LVOT peak gradient      4.1 mmHg      -                        


LVOT mean gradient      2.15 mmHg     -                        


SV LVOT                 59.03 ml      -                        


JONATHAN (continuity Vmax)   2.73 cm2      -                        


JONATHAN (continuity VTI)    2.68 cm2      -                        


Ascending Ao            2.64 cm       -                        


 


Tricuspid Valve


Name                    Value         Normal Range            


TR Vmax                 2.51 m/sec    -                        


TR peak gradient        25 mmHg       -                        


RAP                     3 mmHg        -                        


RVSP                    28 mmHg       -                        


IVC diameter            2.07 cm       (1.2 - 2.3)             


 


Pulmonic Valve/Qp:Qs


Name                    Value         Normal Range            


PV Vmax                 0.9 m/sec     -                        


PV peak gradient        3.24 mmHg     -                        


IN end-diastolic Vmax   0.92 m/sec    -                        


PV acceleration time    91.34 msec    -                        


 


Electronically signed by: Cayetano Gonzalez MD on 02/12/2021


10:32:02











Banks/IV: 


                                        





Voiding Method                   Toilet











Active Medications





- Current Medications


Current Medications: 














Generic Name Dose Route Start Last Admin





  Trade Name Freq  PRN Reason Stop Dose Admin


 


Acetaminophen  650 mg  02/12/21 01:07  02/13/21 04:30





  Acetaminophen 325 Mg Tab  PO   650 mg





  Q4H PRN   Administration





  Pain MILD(1-3)/Fever >100.5/HA  


 


Amlodipine Besylate  10 mg  02/12/21 12:00  02/17/21 09:07





  Amlodipine 10 Mg Tab  PO   10 mg





  QDAY KETTY   Administration


 


Atorvastatin Calcium  20 mg  02/12/21 22:00  02/17/21 21:51





  Atorvastatin 20 Mg Tab  PO   20 mg





  QHS KETTY   Administration


 


Calcium Carbonate/Glycine  1,250 mg  02/12/21 12:00  02/17/21 21:51





  Calcium Carbonate 1250 Mg Tab  PO   1,250 mg





  BID KETTY   Administration


 


Famotidine  10 mg  02/12/21 10:00  02/17/21 21:51





  Famotidine 10 Mg Tab  PO   10 mg





  BID KETTY   Administration


 


Fluticasone Propionate  50 mcg  02/12/21 10:00  02/17/21 09:07





  Fluticasone Propionate Nasal Spray 16 Gm  NS   50 mcg





  QDAY KETTY   Administration


 


Heparin Sodium (Porcine)  5,000 unit  02/12/21 06:00  02/18/21 08:23





  Heparin 5,000 Unit/1 Ml Vial  SUB-Q   Not Given





  Q8HR KETTY  


 


Hydralazine HCl  10 mg  02/12/21 05:12  02/17/21 11:19





  Hydralazine 20 Mg/1 Ml Inj  IV   10 mg





  Q6HR PRN   Administration





  Blood Pressure  


 


Hydroxychloroquine Sulfate  200 mg  02/13/21 10:00  02/17/21 09:07





  Hydroxychloroquine 200 Mg Tab  PO   200 mg





  QDAY KETTY   Administration


 


Sodium Chloride  500 mls @ 50 mls/hr  02/16/21 11:00 





  Nacl 0.9% 500 Ml  IV  





  AS DIRECT KETTY  


 


Morphine Sulfate  2 mg  02/12/21 01:07  02/15/21 00:49





  Morphine 2 Mg/1 Ml Inj  IV   2 mg





  Q4H PRN   Administration





  Pain, Moderate (4-6)  


 


Ondansetron HCl  4 mg  02/12/21 01:07  02/12/21 15:29





  Ondansetron 4 Mg/2 Ml Inj  IV   4 mg





  Q8H PRN   Administration





  Nausea And Vomiting  


 


Sodium Chloride  10 ml  02/12/21 10:00  02/17/21 21:51





  Sodium Chloride 0.9% 10 Ml Flush Syringe  IV   10 ml





  BID KETTY   Administration


 


Sodium Chloride  10 ml  02/12/21 01:07  02/13/21 04:30





  Sodium Chloride 0.9% 10 Ml Flush Syringe  IV   10 ml





  PRN PRN   Administration





  LINE FLUSH

## 2021-02-18 NOTE — OPERATIVE REPORT
Operative Report


Operative Report: 





Exam: Ultrasound fluoroscopic guided placement of tunneled hemodialysis catheter





Clinical indication: Patient with a history of end-stage renal disease, lupus 

requiring catheter for dialysis initiation





Date: 02/18/2021





Procedure: Following an explanation of the risks, benefits and alternatives; 

written informed consent was obtained.  The patient was brought to the 

angiographic suite and placed in supine position on the examination table.  

Initial ultrasound evaluation of her neck demonstrated a patent right internal 

jugular vein.  The patient's right neck and chest wall were prepped and draped 

in the usual sterile fashion.  1% lidocaine was used for anesthesia.





Under ultrasound guidance, the right internal jugular vein was cannulated with a

7 cm 18-gauge needle.  A 0.035 guidewire was advanced under fluoroscopy into the

IVC.  The needle was removed.  An appropriate catheter exit site was chosen 

along the lateral right chest wall.  1% lidocaine was used for anesthesia at the

catheter exit site and along the tunnel tract.  A Bard 19 cm glidepath tunneled 

hemodialysis catheter was then tunneled antegrade from the catheter exit site to

the venotomy site.





Following serial dilation over the guidewire under fluoroscopy, a 15 Indian 

peel-away sheath was placed over the guidewire under fluoroscopy and advanced 

centrally.  The trocar and guidewire were removed.  The catheter was placed 

through the peel-away sheath and the peel-away sheath removed.  The catheter tip

was positioned in the proximal right atrium.  Both ports flushed and aspirated 

easily and were then locked with appropriate volumes of heparin.





The venotomy was closed using 4-0 Vicryl suture and Dermabond.  2-0 Ethilon sutu

re was used to approximate the catheter exit site as well as Dermabond.  Sterile

dressings were applied.





The patient tolerated the procedure well.  There were no immediate postprocedure

complications.





Conscious sedation was performed under the guidance of radiologic nursing.  

Continuous cardiopulmonary monitoring was utilized.





Impression: Ultrasound and fluoroscopic guided placement of tunneled 

hemodialysis catheter via the right internal jugular vein.

## 2021-02-18 NOTE — PROGRESS NOTE
Assessment and Plan





Assessment


CKD stage 5, End Stage Renal Disease, requiring dialysis initiation


Hypertension


Chest pain


Dysuria


Headache








Plan:


-Renal labs reviewed. Serum creatinine 7.5 today, BUN of 68, GFR remain at 8 

ml/min


-Unknown baseline, likely advanced CKD secondary to HTN and possible Lupus 

Nephritis


-Renal biopsy done on 2/17/2021


-Renal US shows CKD signs and she has nephrotic range proteinuria. 


-S/P Methylprednisolone steroid x 3 days


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


-C3/C4, ALISIA, ANCA, anti-GBM-pending


-HIV and Hep panel-negative


-Patient awaiting perm-cath placement and dialysis initiation today  


-Case management onboard for outpatient HD arrangement to [facility]


Date of service: 02/18/21


Principal diagnosis: renal failure


Interval history: 





Patient seen lying in bed. Answered all questions asked.





Objective





- Vital Signs


Vital signs: 


                               Vital Signs - 12hr











  02/18/21 02/18/21 02/18/21





  00:00 00:06 03:58


 


Temperature  98.5 F 98.1 F


 


Pulse Rate  75 102 H


 


Pulse Rate [ 85  





Apical]   


 


Pulse Rate [ 85  





From Monitor]   


 


Respiratory 16 14 14





Rate   


 


Blood Pressure  130/84 138/88


 


O2 Sat by Pulse  95 95





Oximetry   














  02/18/21





  08:04


 


Temperature 98.7 F


 


Pulse Rate 80


 


Pulse Rate [ 





Apical] 


 


Pulse Rate [ 





From Monitor] 


 


Respiratory 18





Rate 


 


Blood Pressure 129/81


 


O2 Sat by Pulse 96





Oximetry 














- General Appearance


General appearance: well-developed, appears stated age, fatigue


EENT: ATNC, PERRL, hearing intact, vision intact


Neck: no JVD, supple


Respiratory: Present: Decreased Breath Sounds


Cardiology: regular, S1S2


Gastrointestinal: normoactive bowel sounds


Integumentary: warm and dry


Neurologic: alert and oriented x3


Musculoskeletal: other (No edema)





- Lab





                                 02/18/21 05:24





                                 02/18/21 05:24


                             Most recent lab results











Calcium  8.0 mg/dL (8.4-10.2)  L  02/18/21  05:24    


 


Phosphorus  5.50 mg/dL (2.5-4.5)  H  02/18/21  05:24    


 


Urine Creatinine  45.6 mg/dL (0.1-20.0)  H  02/12/21  15:38    


 


Urine Creatinine  46.0 mg/dL (0.1-20.0)  H  02/12/21  15:38    


 


Urine Sodium  104 mmol/L  02/12/21  15:38    


 


Urine Total Protein  230 mg/dL (5-11.8)  H  02/12/21  15:38    














Medications & Allergies





- Medications


Allergies/Adverse Reactions: 


                                    Allergies





No Known Allergies Allergy (Verified 03/29/20 20:14)


   








Home Medications: 


                                Home Medications











 Medication  Instructions  Recorded  Confirmed  Last Taken  Type


 


Fluconazole (Nf) [Diflucan TAB] 150 mg PO ONCE #2 tablet 07/10/19 02/16/21 

Unknown Rx


 


metroNIDAZOLE [Flagyl TAB] 500 mg PO Q12HR #14 tab 12/13/19 02/16/21 Unknown Rx


 


Cetirizine HCl [ZyrTEC] 10 mg PO QAM 14 Days #14 capsule 03/29/20 02/16/21 

Unknown Rx


 


Fluticasone [Flonase] 1 spray NS QDAY 14 Days #1 bottle 03/29/20 02/16/21 

Unknown Rx











Active Medications: 














Generic Name Dose Route Start Last Admin





  Trade Name Freq  PRN Reason Stop Dose Admin


 


Acetaminophen  650 mg  02/12/21 01:07  02/13/21 04:30





  Acetaminophen 325 Mg Tab  PO   650 mg





  Q4H PRN   Administration





  Pain MILD(1-3)/Fever >100.5/HA  


 


Amlodipine Besylate  10 mg  02/12/21 12:00  02/17/21 09:07





  Amlodipine 10 Mg Tab  PO   10 mg





  QDAY KETTY   Administration


 


Atorvastatin Calcium  20 mg  02/12/21 22:00  02/17/21 21:51





  Atorvastatin 20 Mg Tab  PO   20 mg





  QHS KETTY   Administration


 


Calcium Carbonate/Glycine  1,250 mg  02/12/21 12:00  02/17/21 21:51





  Calcium Carbonate 1250 Mg Tab  PO   1,250 mg





  BID KETTY   Administration


 


Famotidine  10 mg  02/12/21 10:00  02/17/21 21:51





  Famotidine 10 Mg Tab  PO   10 mg





  BID KETTY   Administration


 


Fluticasone Propionate  50 mcg  02/12/21 10:00  02/17/21 09:07





  Fluticasone Propionate Nasal Spray 16 Gm  NS   50 mcg





  QDAY KETTY   Administration


 


Heparin Sodium (Porcine)  5,000 unit  02/12/21 06:00  02/18/21 08:23





  Heparin 5,000 Unit/1 Ml Vial  SUB-Q   Not Given





  Q8HR KETTY  


 


Hydralazine HCl  10 mg  02/12/21 05:12  02/17/21 11:19





  Hydralazine 20 Mg/1 Ml Inj  IV   10 mg





  Q6HR PRN   Administration





  Blood Pressure  


 


Hydroxychloroquine Sulfate  200 mg  02/13/21 10:00  02/17/21 09:07





  Hydroxychloroquine 200 Mg Tab  PO   200 mg





  QDAY KETTY   Administration


 


Sodium Chloride  500 mls @ 50 mls/hr  02/16/21 11:00 





  Nacl 0.9% 500 Ml  IV  





  AS DIRECT KETTY  


 


Morphine Sulfate  2 mg  02/12/21 01:07  02/15/21 00:49





  Morphine 2 Mg/1 Ml Inj  IV   2 mg





  Q4H PRN   Administration





  Pain, Moderate (4-6)  


 


Ondansetron HCl  4 mg  02/12/21 01:07  02/12/21 15:29





  Ondansetron 4 Mg/2 Ml Inj  IV   4 mg





  Q8H PRN   Administration





  Nausea And Vomiting  


 


Sodium Chloride  10 ml  02/12/21 10:00  02/17/21 21:51





  Sodium Chloride 0.9% 10 Ml Flush Syringe  IV   10 ml





  BID KETTY   Administration


 


Sodium Chloride  10 ml  02/12/21 01:07  02/13/21 04:30





  Sodium Chloride 0.9% 10 Ml Flush Syringe  IV   10 ml





  PRN PRN   Administration





  LINE FLUSH

## 2021-02-19 VITALS — DIASTOLIC BLOOD PRESSURE: 77 MMHG | SYSTOLIC BLOOD PRESSURE: 140 MMHG

## 2021-02-19 LAB
ANISOCYTOSIS BLD QL SMEAR: (no result)
BAND NEUTROPHILS # (MANUAL): 0 K/MM3
BUN SERPL-MCNC: 34 MG/DL (ref 7–17)
BUN/CREAT SERPL: 7 %
CALCIUM SERPL-MCNC: 7.5 MG/DL (ref 8.4–10.2)
HCT VFR BLD CALC: 29.1 % (ref 30.3–42.9)
HEMOLYSIS INDEX: 0
HGB BLD-MCNC: 9.6 GM/DL (ref 10.1–14.3)
MCHC RBC AUTO-ENTMCNC: 33 % (ref 30–34)
MCV RBC AUTO: 80 FL (ref 79–97)
MYELOCYTES # (MANUAL): 0 K/MM3
PLATELET # BLD: 222 K/MM3 (ref 140–440)
PROMYELOCYTES # (MANUAL): 0 K/MM3
RBC # BLD AUTO: 3.65 M/MM3 (ref 3.65–5.03)
TOTAL CELLS COUNTED BLD: 100

## 2021-02-19 PROCEDURE — 5A1D70Z PERFORMANCE OF URINARY FILTRATION, INTERMITTENT, LESS THAN 6 HOURS PER DAY: ICD-10-PCS | Performed by: HOSPITALIST

## 2021-02-19 RX ADMIN — Medication SCH ML: at 10:04

## 2021-02-19 RX ADMIN — FAMOTIDINE SCH MG: 10 TABLET ORAL at 10:04

## 2021-02-19 RX ADMIN — HEPARIN SODIUM SCH: 5000 INJECTION, SOLUTION INTRAVENOUS; SUBCUTANEOUS at 05:50

## 2021-02-19 RX ADMIN — HEPARIN SODIUM SCH: 5000 INJECTION, SOLUTION INTRAVENOUS; SUBCUTANEOUS at 14:01

## 2021-02-19 RX ADMIN — FLUTICASONE PROPIONATE SCH MCG: 50 SPRAY, METERED NASAL at 10:03

## 2021-02-19 RX ADMIN — CALCIUM SCH MG: 500 TABLET ORAL at 10:04

## 2021-02-19 RX ADMIN — BENZONATATE SCH MG: 100 CAPSULE ORAL at 10:04

## 2021-02-19 RX ADMIN — HYDROXYCHLOROQUINE SULFATE SCH MG: 200 TABLET ORAL at 10:04

## 2021-02-19 NOTE — PROGRESS NOTE
Assessment and Plan


Assessment and plan: 





Acute kidney failure, lupus nephritis





Back pain





Chest pain





sepsis


Continue empiric Rocephin 1 g IV daily.  Patient is also on Diflucan.





Headache


Tylenol 650 p.o. every 6 hours as needed.  Morphine 1 to 2 mg IV every 4 hours 

as needed.











2/13/2021


-Patient is septic evidenced by leukocytosis and fever.  Patient is on IV Zosyn.

 UA is negative


-Patient has acute renal failure versus acute on chronic renal failure, with GFR

of 10, nephrology is following.  Patient has lupus and likely due to lupus 

nephritis


-Pain is likely due to lupus, I placed the patient on steroids and 

hydroxychloroquine


-Pain control





2/14/2021


-Patient is still leukocytosis [but trending down] but no fever over the last 24

hours.  Blood and urine cultures are negative.  Fever is likely due to lupus.  I

will stop the IV antibiotics


-Patient has acute renal failure and creatinine is worsening overnight increased

to 7.3.  Nephrology is following and planning kidney biopsy and possible 

dialysis.


-I put the patient on hydroxychloroquine and prednisone.





2/15/2021.  Patient with unknown baseline creatinine/renal function.  Patient 

likely has advanced CKD secondary to hypertension and possible lupus nephritis. 

 Renal US shows CKD signs and she has nephrotic range proteinuria.  Kidney 

biopsy per nephrology.  Pt says she will decide about dialysis after kidney bx, 

Cr is worsening.  Nephrology initiated pulse dosed steroids.  Hepatitis B and C-

, HIV negative.  Antibiotic discontinued.  Blood and urine cultures negative x48

hours.





2/16/2021.  Patient continues to have elevated creatinine and BUN.  Patient with

unknown baseline and likely has advanced CKD secondary to HTN and possible Lupus

Nephritis.  Renal US shows CKD signs and she has nephrotic range proteinuria.  

Patient has completed steroid pulse of 3 days.  C3/C4, ALISIA, ANCA, 

anti-GBM-pending.  HIV and Hep panel-negative.  Renal biopsy for today.  Ne

phrology with extensive discussion regarding hemodialysis,  risks and benefits 

of dialysis and medical complications that may arise if dialysis is not done to 

include Volume Overload, Uremia/Metabolic Encephalopathy and Death.  I 

reiterated the same risks.  Patient contemplating leaving AMA.





2/17/2021.  Patient remains uncooperative for the interview and exam.  Patient 

has not made a decision regarding initiation of hemodialysis.  Patient states 

that she will decide about hemodialysis after kidney biopsy.  Continue 

supportive care.  Follow-up laboratory as noted above





2/18/2021. Patient has decided to proceed with hemodialysis. Perm-Cath placement

per vascular surgery today and subsequent initiation of hemodialysis. Patient is

s/p methylprednisolone steroid x3 days.  C3/C4, ALISIA, ANCA, anti-GBM-pending.  

HIV and Hep panel-negative.  Case management onboard for outpatient HD 

arrangement to [facility]





2/19/2021.  IR placed tunneled hemodialysis cath via right internal jugular 

vein.  Hemodialysis per nephrology recommendations.  Follow-up studies as noted 

above.  Outpatient hemodialysis arrangement as  Is dialysis clinic





History


Interval history: 





No new issues overnight.  





Hospitalist Physical





- Constitutional


Vitals: 


                                        











Temp Pulse Resp BP Pulse Ox


 


 98.3 F   64   16   142/97   96 


 


 02/19/21 04:04  02/19/21 06:00  02/19/21 04:04  02/19/21 04:04  02/19/21 04:04











General appearance: Present: no acute distress, well-nourished





- EENT


Eyes: Present: PERRL, EOM intact


ENT: hearing intact, clear oral mucosa, dentition normal





- Neck


Neck: Present: supple, normal ROM





- Respiratory


Respiratory effort: normal


Respiratory: bilateral: CTA





- Cardiovascular


Rhythm: regular


Heart Sounds: Present: S1 & S2.  Absent: gallop, rub





- Extremities


Extremities: no ischemia, No edema, Full ROM





- Abdominal


General gastrointestinal: soft, non-tender, non-distended, normal bowel sounds





- Integumentary


Integumentary: Present: clear, warm, dry





- Neurologic


Neurologic: CNII-XII intact, moves all extremities





HEART Score





- HEART Score


EKG: Normal


Age: < 45


Risk factors: No known risk factors


Troponin: 


                                        











Troponin T  < 0.010 ng/mL (0.00-0.029)   02/12/21  01:08    











Troponin: < normal limit





Results





- Labs


CBC & Chem 7: 


                                 02/19/21 04:20





                                 02/19/21 04:20


Labs: 


                             Laboratory Last Values











WBC  13.9 K/mm3 (4.5-11.0)  H  02/19/21  04:20    


 


RBC  3.65 M/mm3 (3.65-5.03)   02/19/21  04:20    


 


Hgb  9.6 gm/dl (10.1-14.3)  L  02/19/21  04:20    


 


Hct  29.1 % (30.3-42.9)  L  02/19/21  04:20    


 


MCV  80 fl (79-97)   02/19/21  04:20    


 


MCH  26 pg (28-32)  L  02/19/21  04:20    


 


MCHC  33 % (30-34)   02/19/21  04:20    


 


RDW  13.9 % (13.2-15.2)   02/19/21  04:20    


 


Plt Count  222 K/mm3 (140-440)   02/19/21  04:20    


 


Lymph % (Auto)  1.8 % (13.4-35.0)  L  02/18/21  05:24    


 


Mono % (Auto)  10.7 % (0.0-7.3)  H  02/18/21  05:24    


 


Eos % (Auto)  0.0 % (0.0-4.3)   02/18/21  05:24    


 


Baso % (Auto)  0.0 % (0.0-1.8)   02/18/21  05:24    


 


Lymph # (Auto)  0.3 K/mm3 (1.2-5.4)  L  02/18/21  05:24    


 


Mono # (Auto)  2.0 K/mm3 (0.0-0.8)  H  02/18/21  05:24    


 


Eos # (Auto)  0.0 K/mm3 (0.0-0.4)   02/18/21  05:24    


 


Baso # (Auto)  0.0 K/mm3 (0.0-0.1)   02/18/21  05:24    


 


Add Manual Diff  Complete   02/19/21  04:20    


 


Total Counted  100   02/19/21  04:20    


 


Seg Neutrophils %  87.5 % (40.0-70.0)  H  02/18/21  05:24    


 


Seg Neuts % (Manual)  77.0 % (40.0-70.0)  H  02/19/21  04:20    


 


Lymphocytes % (Manual)  7.0 % (13.4-35.0)  L  02/19/21  04:20    


 


Monocytes % (Manual)  15.0 % (0.0-7.3)  H  02/19/21  04:20    


 


Metamyelocytes %  1.0 %  02/19/21  04:20    


 


Nucleated RBC %  Not Reportable   02/19/21  04:20    


 


Seg Neutrophils #  16.7 K/mm3 (1.8-7.7)  H  02/18/21  05:24    


 


Seg Neutrophils # Man  10.7 K/mm3 (1.8-7.7)  H  02/19/21  04:20    


 


Band Neutrophils #  0.0 K/mm3  02/19/21  04:20    


 


Lymphocytes # (Manual)  1.0 K/mm3 (1.2-5.4)  L  02/19/21  04:20    


 


Abs React Lymphs (Man)  0.0 K/mm3  02/19/21  04:20    


 


Monocytes # (Manual)  2.1 K/mm3 (0.0-0.8)  H  02/19/21  04:20    


 


Eosinophils # (Manual)  0.0 K/mm3 (0.0-0.4)   02/19/21  04:20    


 


Basophils # (Manual)  0.0 K/mm3 (0.0-0.1)   02/19/21  04:20    


 


Metamyelocytes #  0.1 K/mm3  02/19/21  04:20    


 


Myelocytes #  0.0 K/mm3  02/19/21  04:20    


 


Promyelocytes #  0.0 K/mm3  02/19/21  04:20    


 


Blast Cells #  0.0 K/mm3  02/19/21  04:20    


 


WBC Morphology  Not Reportable   02/19/21  04:20    


 


Hypersegmented Neuts  Not Reportable   02/19/21  04:20    


 


Hyposegmented Neuts  Not Reportable   02/19/21  04:20    


 


Hypogranular Neuts  Not Reportable   02/19/21  04:20    


 


Smudge Cells  Not Reportable   02/19/21  04:20    


 


Toxic Granulation  Not Reportable   02/19/21  04:20    


 


Toxic Vacuolation  Not Reportable   02/19/21  04:20    


 


Dohle Bodies  Not Reportable   02/19/21  04:20    


 


Pelger-Huet Anomaly  Not Reportable   02/19/21  04:20    


 


Wendy Rods  Not Reportable   02/19/21  04:20    


 


Platelet Estimate  Consistent w auto   02/19/21  04:20    


 


Clumped Platelets  Not Reportable   02/19/21  04:20    


 


Plt Clumps, EDTA  Not Reportable   02/19/21  04:20    


 


Large Platelets  Not Reportable   02/19/21  04:20    


 


Giant Platelets  Not Reportable   02/19/21  04:20    


 


Platelet Satelliting  Not Reportable   02/19/21  04:20    


 


Plt Morphology Comment  Not Reportable   02/19/21  04:20    


 


RBC Morphology  Not Reportable   02/19/21  04:20    


 


Dimorphic RBCs  Not Reportable   02/19/21  04:20    


 


Polychromasia  Not Reportable   02/19/21  04:20    


 


Hypochromasia  Not Reportable   02/19/21  04:20    


 


Poikilocytosis  Not Reportable   02/19/21  04:20    


 


Anisocytosis  1+   02/19/21  04:20    


 


Microcytosis  Few   02/19/21  04:20    


 


Macrocytosis  Not Reportable   02/19/21  04:20    


 


Spherocytes  Not Reportable   02/19/21  04:20    


 


Pappenheimer Bodies  Not Reportable   02/19/21  04:20    


 


Sickle Cells  Not Reportable   02/19/21  04:20    


 


Target Cells  Not Reportable   02/19/21  04:20    


 


Tear Drop Cells  Not Reportable   02/19/21  04:20    


 


Ovalocytes  Not Reportable   02/19/21  04:20    


 


Helmet Cells  Not Reportable   02/19/21  04:20    


 


Ch[place] Bodies  Not Reportable   02/19/21  04:20    


 


Cabot Rings  Not Reportable   02/19/21  04:20    


 


[place] Cells  Not Reportable   02/19/21  04:20    


 


Bite Cells  Not Reportable   02/19/21  04:20    


 


Crenated Cell  Not Reportable   02/19/21  04:20    


 


Elliptocytes  Not Reportable   02/19/21  04:20    


 


Acanthocytes (Spur)  Not Reportable   02/19/21  04:20    


 


Rouleaux  Not Reportable   02/19/21  04:20    


 


Hemoglobin C Crystals  Not Reportable   02/19/21  04:20    


 


Schistocytes  Few   02/19/21  04:20    


 


Malaria parasites  Not Reportable   02/19/21  04:20    


 


Kurtis Bodies  Not Reportable   02/19/21  04:20    


 


Hem Pathologist Commnt  No   02/19/21  04:20    


 


PT  13.1 Sec. (12.2-14.9)   02/16/21  08:43    


 


INR  1.01  (0.87-1.13)   02/16/21  08:43    


 


APTT  33.6 Sec. (24.2-36.6)   02/16/21  08:43    


 


Sodium  137 mmol/L (137-145)   02/19/21  04:20    


 


Potassium  3.9 mmol/L (3.6-5.0)   02/19/21  04:20    


 


Chloride  101.4 mmol/L ()   02/19/21  04:20    


 


Carbon Dioxide  26 mmol/L (22-30)   02/19/21  04:20    


 


Anion Gap  14 mmol/L  02/19/21  04:20    


 


BUN  34 mg/dL (7-17)  H  02/19/21  04:20    


 


Creatinine  4.9 mg/dL (0.6-1.2)  H  02/19/21  04:20    


 


Estimated GFR  13 ml/min  02/19/21  04:20    


 


BUN/Creatinine Ratio  7 %  02/19/21  04:20    


 


Glucose  98 mg/dL ()   02/19/21  04:20    


 


Uric Acid  7.1 mg/dL (3.5-7.6)   02/12/21  14:10    


 


Calcium  7.5 mg/dL (8.4-10.2)  L  02/19/21  04:20    


 


Phosphorus  5.50 mg/dL (2.5-4.5)  H  02/18/21  05:24    


 


Iron  19 ug/dL ()  L  02/13/21  04:40    


 


TIBC  176 mcg/dL (250-450)  L  02/13/21  04:40    


 


Ferritin  130.0 ng/mL (10.0-200.0)   02/13/21  04:40    


 


Lactate Dehydrogenase  296 units/L ()  H  02/12/21  10:15    


 


Total Creatine Kinase  152 units/L ()  H  02/12/21  10:15    


 


Troponin T  < 0.010 ng/mL (0.00-0.029)   02/12/21  01:08    


 


Lipase  71 units/L (13-60)  H  02/11/21  23:09    


 


PTH Intact  359.1 pg/mL (15-65)  H  02/13/21  16:10    


 


Urine Color  Colorless  (Yellow)   02/12/21  01:10    


 


Urine Turbidity  Clear  (Clear)   02/12/21  01:10    


 


Urine pH  7.0  (5.0-7.0)   02/12/21  01:10    


 


Ur Specific Gravity  1.007  (1.003-1.030)   02/12/21  01:10    


 


Urine Protein  >500 mg/dL (Negative)   02/12/21  01:10    


 


Urine Glucose (UA)  50 mg/dL (Negative)   02/12/21  01:10    


 


Urine Ketones  Neg mg/dL (Negative)   02/12/21  01:10    


 


Urine Blood  Sm  (Negative)   02/12/21  01:10    


 


Urine Nitrite  Neg  (Negative)   02/12/21  01:10    


 


Urine Bilirubin  Neg  (Negative)   02/12/21  01:10    


 


Urine Urobilinogen  < 2.0 mg/dL (<2.0)   02/12/21  01:10    


 


Ur Leukocyte Esterase  Neg  (Negative)   02/12/21  01:10    


 


Urine WBC (Auto)  4.0 /HPF (0.0-6.0)   02/12/21  01:10    


 


Urine RBC (Auto)  15.0 /HPF (0.0-6.0)   02/12/21  01:10    


 


U Epithel Cells (Auto)  < 1.0 /HPF (0-13.0)   02/12/21  01:10    


 


Hyaline Casts  1 /LPF  02/11/21  22:00    


 


Urine Mucus  Few /HPF  02/12/21  01:10    


 


Urine Eosinophils  Rare eosinophil seen  (None Seen)   02/12/21  15:47    


 


Urine Creatinine  45.6 mg/dL (0.1-20.0)  H  02/12/21  15:38    


 


Urine Creatinine  46.0 mg/dL (0.1-20.0)  H  02/12/21  15:38    


 


Protein/Creatinin Ratio  5.04   02/12/21  15:38    


 


Urine Sodium  104 mmol/L  02/12/21  15:38    


 


Urine Total Protein  230 mg/dL (5-11.8)  H  02/12/21  15:38    


 


Urine HCG, Qual  Negative  (Negative)   02/11/21  22:00    


 


Proteinase 3 (PR3) Ab  <1.0 AI (<1.0)   02/12/21  14:10    


 


Myeloperoxidase Ab  <1.0 AI (<1.0)   02/12/21  14:10    


 


Complement C3  120 mg/dL ()   02/13/21  16:10    


 


Complement C4  25 mg/dL (15-57)   02/13/21  16:10    


 


Coronavirus (PCR)  Negative  (Negative)   02/13/21  10:15    


 


Hep Bs Antigen  Non-reactive  (Negative)   02/12/21  14:10    


 


Hepatitis C Antibody  Non-reactive  (NonReactive)   02/12/21  14:10    


 


HIV 1&2 Antibody Rapid  Non react  (Non React)   02/12/21  10:15    


 


HIV P24 Antigen  Non react  (Non React)   02/12/21  10:15    


 


Schistocytes Smear  Rare   02/12/21  10:15    














- Diagnostic Impressions


Diagnostic Impressions: 








Echocardiogram  02/12/21 01:20


Transthoracic Echocardiogram


 


Indication:


Chest pain


BP:           163/113


HR:           89


 


Conclusions


 *Unremarkable echo-doppler study.


 


Findings     


Left Ventricle:


The left ventricular chamber size is normal. Global left ventricular


wall motion and contractility are within normal limits. Global left


ventricular systolic function is normal. The estimated ejection fraction


is 55-60%.  Normal left ventricular diastolic filling is observed. 


 


Left Atrium:


The left atrial chamber size is normal. 


 


Right Ventricle:


The right ventricular cavity size is normal. 


 


Right Atrium:


The right atrial cavity size is normal. 


 


Aortic Valve:


The aortic valve structure is normal. There is no evidence of aortic


regurgitation. 


 


Mitral Valve:


The mitral valve leaflets are mildly thickened. There is no evidence of


mitral regurgitation. 


 


Tricuspid Valve:


The tricuspid valve leaflets are normal.  There is mild tricuspid


regurgitation. The right ventricular systolic pressure is calculated at


28 mmHg.  


 


Pulmonic Valve:


The pulmonic valve appears normal. There is trace pulmonic


regurgitation.  


 


Pericardium:


There is no pericardial effusion. 


 


Aorta:


The aorta appears normal.  


 


Venous:


The inferior vena cava appears normal. 


 


Measurements     


Chambers 2D


Name                    Value         Normal Range            


IVSd (2D)               0.96 cm       (0.6 - 1.1)             


LVPWd (2D)              0.9 cm        (0.6 - 1.1)             


LVIDd (2D)              4.59 cm       (3.7 - 5.6)             


LVIDs (2D)              2.99 cm       (2 - 3.8)               


LV FS (2D)              34.92 %       -                        


EF Teichholz (2D)       64.24 %       -                        


Ao root diameter (2D)   2.62 cm       (2 - 3.7)               


 


Volumes/Mass


Name                    Value         Normal Range            


LA ESV SP 4CH (A/L)     14.15 ml      -                        


LA ESV SP 2CH (A/L)     23.9 ml       -                        


LA ESV BP (A/L)         18.76 ml      -                        


LA ESV BP (A/L) index   12.03 ml/m2   -                        


LA ESV SP 4CH (MOD)     12.91 ml      -                        


LA ESV SP 2CH (MOD)     24.49 ml      -                        


LA ESV BP (MOD)         17.61 ml      -                        


LA ESV BP (MOD) index   11.29 ml/m2   -                        


 


Diastolic/Systolic Function


Name                    Value         Normal Range            


MV E-wave Vmax          0.88 m/sec    -                        


MV deceleration time    164.26 msec   -                        


MV A-wave Vmax          0.75 m/sec    -                        


MV E:A ratio            1.18 ratio    -                        


 


Aortic Valve


Name                    Value         Normal Range            


AV Vmax                 1.23 m/sec    -                        


AV VTI                  22.05 cm      -                        


AV peak gradient        6.04 mmHg     -                        


AV mean gradient        3.49 mmHg     -                        


LVOT diameter           2.05 cm       -                        


LVOT Vmax               1.01 m/sec    -                        


LVOT VTI                17.84 cm      -                        


LVOT peak gradient      4.1 mmHg      -                        


LVOT mean gradient      2.15 mmHg     -                        


SV LVOT                 59.03 ml      -                        


JONATHAN (continuity Vmax)   2.73 cm2      -                        


JONATHAN (continuity VTI)    2.68 cm2      -                        


Ascending Ao            2.64 cm       -                        


 


Tricuspid Valve


Name                    Value         Normal Range            


TR Vmax                 2.51 m/sec    -                        


TR peak gradient        25 mmHg       -                        


RAP                     3 mmHg        -                        


RVSP                    28 mmHg       -                        


IVC diameter            2.07 cm       (1.2 - 2.3)             


 


Pulmonic Valve/Qp:Qs


Name                    Value         Normal Range            


PV Vmax                 0.9 m/sec     -                        


PV peak gradient        3.24 mmHg     -                        


CT end-diastolic Vmax   0.92 m/sec    -                        


PV acceleration time    91.34 msec    -                        


 


Electronically signed by: Cayetano Gonzalez MD on 02/12/2021


10:32:02











Banks/IV: 


                                        





Voiding Method                   Toilet











Active Medications





- Current Medications


Current Medications: 














Generic Name Dose Route Start Last Admin





  Trade Name Freq  PRN Reason Stop Dose Admin


 


Acetaminophen  650 mg  02/12/21 01:07  02/18/21 18:17





  Acetaminophen 325 Mg Tab  PO   650 mg





  Q4H PRN   Administration





  Pain MILD(1-3)/Fever >100.5/HA  


 


Amlodipine Besylate  10 mg  02/12/21 12:00  02/18/21 13:13





  Amlodipine 10 Mg Tab  PO   Not Given





  QDAY KETTY  


 


Atorvastatin Calcium  20 mg  02/12/21 22:00  02/18/21 22:06





  Atorvastatin 20 Mg Tab  PO   20 mg





  QHS KETTY   Administration


 


Benzonatate  100 mg  02/18/21 22:00  02/18/21 22:06





  Benzonatate 100 Mg Cap  PO   100 mg





  BID KETTY   Administration


 


Calcium Carbonate/Glycine  1,250 mg  02/12/21 12:00  02/18/21 22:06





  Calcium Carbonate 1250 Mg Tab  PO   1,250 mg





  BID KETTY   Administration


 


Famotidine  10 mg  02/12/21 10:00  02/18/21 22:06





  Famotidine 10 Mg Tab  PO   10 mg





  BID KETTY   Administration


 


Fluticasone Propionate  50 mcg  02/12/21 10:00  02/18/21 13:12





  Fluticasone Propionate Nasal Spray 16 Gm  NS   50 mcg





  QDAY [facility]   Administration


 


Guaifenesin  200 mg  02/18/21 17:51  02/18/21 18:40





  Guaifenesin 100 Mg/5 Ml Oral Liqd  PO   200 mg





  Q4H PRN   Administration





  Cough  


 


Heparin Sodium (Porcine)  5,000 unit  02/12/21 06:00  02/19/21 05:50





  Heparin 5,000 Unit/1 Ml Vial  SUB-Q   Not Given





  Q8HR [facility]  


 


Hydralazine HCl  10 mg  02/12/21 05:12  02/17/21 11:19





  Hydralazine 20 Mg/1 Ml Inj  IV   10 mg





  Q6HR PRN   Administration





  Blood Pressure  


 


Hydroxychloroquine Sulfate  200 mg  02/13/21 10:00  02/18/21 13:13





  Hydroxychloroquine 200 Mg Tab  PO   Not Given





  QDAY [facility]  


 


Sodium Chloride  500 mls @ 50 mls/hr  02/16/21 11:00 





  Nacl 0.9% 500 Ml  IV  





  AS DIRECT [facility]  


 


Morphine Sulfate  2 mg  02/12/21 01:07  02/15/21 00:49





  Morphine 2 Mg/1 Ml Inj  IV   2 mg





  Q4H PRN   Administration





  Pain, Moderate (4-6)  


 


Ondansetron HCl  4 mg  02/12/21 01:07  02/12/21 15:29





  Ondansetron 4 Mg/2 Ml Inj  IV   4 mg





  Q8H PRN   Administration





  Nausea And Vomiting  


 


Sodium Chloride  10 ml  02/12/21 10:00  02/18/21 22:07





  Sodium Chloride 0.9% 10 Ml Flush Syringe  IV   10 ml





  BID KETTY   Administration


 


Sodium Chloride  10 ml  02/12/21 01:07  02/13/21 04:30





  Sodium Chloride 0.9% 10 Ml Flush Syringe  IV   10 ml





  PRN PRN   Administration





  LINE FLUSH

## 2021-02-19 NOTE — DISCHARGE SUMMARY
Providers





- Providers


Date of Admission: 


02/13/21 17:48





Date of discharge: 02/19/21


Attending physician: 


TERRI MOREIRA





                                        





02/12/21 00:33


Consult to Physician [CONS] Routine 


   Comment: 


   Consulting Provider: MARCIAL MANCIA


   Physician Instructions: 


   Reason For Exam: ARF





02/16/21 14:43


Midline [Consult to PICC Line RN] [CONS] Stat 


   Reason For Exam: MID LINE


   Type Line:: Midline





02/17/21 12:20


Consult to Case Management [CONS] Routine 


   Services Needed at Discharge: Other


   Notified:: case management


   Comment:: Outpatient HD arrangement to [facility]


   Additional Physician Instructions: Please arrange outpatient HD to 

[facility] located at


                                        [address] [place] [address]





02/18/21 07:00


Consult to Interventional Radiology [CONS] Routine 


   Consulting Provider: MARY LONGORIA


   Reason For Exam: Perm-cath placement on 2/18/21











Primary care physician: 


PRIMARY CARE MD








Hospitalization


Reason for admission: ESRD


Condition: Critical


Hospital course: 





20-year-old female came through the emergency department with complaints of 

headache and found to have elevated creatinine and blood pressure.  The patient 

was admitted with diagnosis of acute renal failure on CKD and accelerated 

hypertension.  Patient also had an admitting diagnosis of SIRS.  No evidence of 

sepsis because no obvious signs of infection the patient reported questionable 

history of lupus nephritis with a kidney biopsy that was completed approximately

 4 years ago.  Patient however had not follow-up with the physician and 

exhibited medical noncompliance.  Patient reports being treated with steroids in

 the past but no other immunosuppression medication.  Hospital course:





2/13/2021


-Patient is septic evidenced by leukocytosis and fever.  Patient is on IV Zosyn.

  UA is negative


-Patient has acute renal failure versus acute on chronic renal failure, with GFR

 of 10, nephrology is following.  Patient has lupus and likely due to lupus 

nephritis


-Pain is likely due to lupus, I placed the patient on steroids and 

hydroxychloroquine


-Pain control





2/14/2021


-Patient is still leukocytosis [but trending down] but no fever over the last 24

hours.  Blood and urine cultures are negative.  Fever is likely due to lupus.  I

will stop the IV antibiotics


-Patient has acute renal failure and creatinine is worsening overnight increased

to 7.3.  Nephrology is following and planning kidney biopsy and possible 

dialysis.


-I put the patient on hydroxychloroquine and prednisone.





2/15/2021.  Patient with unknown baseline creatinine/renal function.  Patient 

likely has advanced CKD secondary to hypertension and possible lupus nephritis. 

 Renal US shows CKD signs and she has nephrotic range proteinuria.  Kidney 

biopsy per nephrology.  Pt says she will decide about dialysis after kidney bx, 

Cr is worsening.  Nephrology initiated pulse dosed steroids.  Hepatitis B and C-

, HIV negative.  Antibiotic discontinued.  Blood and urine cultures negative x48

hours.





2/16/2021.  Patient continues to have elevated creatinine and BUN.  Patient with

unknown baseline and likely has advanced CKD secondary to HTN and possible Lupus

Nephritis.  Renal US shows CKD signs and she has nephrotic range proteinuria.  

Patient has completed steroid pulse of 3 days.  C3/C4, ALISIA, ANCA, anti

-GBM-pending.  HIV and Hep panel-negative.  Renal biopsy for today.  Nephrology 

with extensive discussion regarding hemodialysis,  risks and benefits of 

dialysis and medical complications that may arise if dialysis is not done to 

include Volume Overload, Uremia/Metabolic Encephalopathy and Death.  I 

reiterated the same risks.  Patient contemplating leaving AMA.





2/17/2021.  Patient remains uncooperative for the interview and exam.  Patient 

has not made a decision regarding initiation of hemodialysis.  Patient states 

that she will decide about hemodialysis after kidney biopsy.  Continue support

oskar care.  Follow-up laboratory as noted above





2/18/2021. Patient has decided to proceed with hemodialysis. Perm-Cath placement

per vascular surgery today and subsequent initiation of hemodialysis. Patient is

s/p methylprednisolone steroid x3 days.  C3/C4, ALISIA, ANCA, anti-GBM-pending.  

HIV and Hep panel-negative.  Case management onboard for outpatient HD 

arrangement to [facility]





2/19/2021.  IR placed tunneled hemodialysis cath via right internal jugular 

vein.  Hemodialysis per nephrology recommendations.  Follow-up studies as noted 

above.  Outpatient hemodialysis arrangement completed by case management.  

Nephrology reports patient can discharge home after hemodialysis today and 

follow-up as an outpatient dedicated discharge time 35 minutes


Disposition: DC-01 TO HOME OR SELFCARE


Time spent for discharge: 35





Core Measure Documentation





- Palliative Care


Palliative Care/ Comfort Measures: Not Applicable





- Core Measures


Any of the following diagnoses?: none





Exam





- Constitutional


Vitals: 


                                        











Temp Pulse Resp BP Pulse Ox


 


 98.6 F   91 H  18   144/99   96 


 


 02/19/21 08:27  02/19/21 08:27  02/19/21 08:27  02/19/21 08:27  02/19/21 08:27











General appearance: Present: no acute distress, well-nourished





- EENT


Eyes: Present: PERRL


ENT: hearing intact, clear oral mucosa





- Neck


Neck: Present: supple, normal ROM





- Respiratory


Respiratory effort: normal


Respiratory: bilateral: CTA





- Cardiovascular


Heart Sounds: Present: S1 & S2.  Absent: rub, click





- Extremities


Extremities: pulses symmetrical, No edema


Peripheral Pulses: within normal limits





- Abdominal


General gastrointestinal: Present: soft, non-tender, non-distended, normal bowel

sounds


Female genitourinary: Present: normal





- Integumentary


Integumentary: Present: clear, warm, dry





- Musculoskeletal


Musculoskeletal: gait normal, strength equal bilaterally





- Psychiatric


Psychiatric: appropriate mood/affect, intact judgment & insight





- Neurologic


Neurologic: CNII-XII intact, moves all extremities





Plan


Activity: advance as tolerated


Weight Bearing Status: Weight Bear as Tolerated


Diet: renal


Follow up with: 


PRIMARY CARE,MD [Primary Care Provider] - 7 Days


ALEJANDRA CHRISTENSEN MD [Staff Physician] - 7 Days


Prescriptions: 


amLODIPine 10 mg PO QDAY #30 tablet


AtorvaSTATin [Lipitor] 20 mg PO QHS #30 tablet


Calcium Carbonate [Oscal 1250MG TAB] 1,250 mg PO BID #60 tablet


Famotidine [Pepcid] 10 mg PO BID #60 tablet


Hydroxychloroquine [Plaquenil] 200 mg PO QDAY #30 tablet

## 2021-02-19 NOTE — PROGRESS NOTE
Assessment and Plan





Assessment


CKD stage 5, End Stage Renal Disease, requiring dialysis initiation


Hypertension


Chest pain


Dysuria


Headache








Plan:


-S/P perm-cath placement and first hemodialysis treatment yesterday on 2/18/21


-Second hemodialysis treatment today via right IJ perm-catheter


-Patient is accepted to [facility] with a start date of Tuesday 2/23/21 at 6:30 a.m


-Patient can be discharged home after hemodialysis today and come to [facility] next Tuesday as above to start outpatient HD 


-Patient to remain on a fluid restriction of 1 liter per day


-Patient to continue on her routine medications


-Patient instructed not to remove or get perm-cath dressing wet and dressing 

changes will be done at the dialysis clinic. 


-Discussed renal plan of care with patient,  and Attending











Subjective


Date of service: 02/19/21


Principal diagnosis: renal failure


Interval history: 





Patient seen lying in bed. Answered all questions asked. Wants to go home today.





Objective





- Vital Signs


Vital signs: 


                               Vital Signs - 12hr











  02/18/21 02/18/21 02/19/21





  23:00 23:30 02:00


 


Temperature  98.0 F 


 


Pulse Rate  91 H 


 


Pulse Rate [ 103 H  





From Monitor]   


 


Respiratory 17 16 





Rate   


 


Respiratory   17





Rate [Soft   





Tissue]   


 


Blood Pressure  116/73 


 


O2 Sat by Pulse 96 91 





Oximetry   














  02/19/21 02/19/21 02/19/21





  04:04 06:00 08:27


 


Temperature 98.3 F  98.6 F


 


Pulse Rate 66 64 91 H


 


Pulse Rate [   





From Monitor]   


 


Respiratory 16  18





Rate   


 


Respiratory   





Rate [Soft   





Tissue]   


 


Blood Pressure 142/97  144/99


 


O2 Sat by Pulse 96  96





Oximetry   














- General Appearance


General appearance: well-developed, appears stated age, fatigue


EENT: ATNC, PERRL, hearing intact, vision intact


Neck: no JVD, supple


Respiratory: Present: Decreased Breath Sounds


Cardiology: S1S2


Gastrointestinal: normoactive bowel sounds


Integumentary: warm and dry


Neurologic: alert and oriented x3


Musculoskeletal: other (No edema)





- Lab





                                 02/19/21 04:20





                                 02/19/21 04:20


                             Most recent lab results











Calcium  7.5 mg/dL (8.4-10.2)  L  02/19/21  04:20    


 


Phosphorus  5.50 mg/dL (2.5-4.5)  H  02/18/21  05:24    


 


Urine Creatinine  45.6 mg/dL (0.1-20.0)  H  02/12/21  15:38    


 


Urine Creatinine  46.0 mg/dL (0.1-20.0)  H  02/12/21  15:38    


 


Urine Sodium  104 mmol/L  02/12/21  15:38    


 


Urine Total Protein  230 mg/dL (5-11.8)  H  02/12/21  15:38    














Medications & Allergies





- Medications


Allergies/Adverse Reactions: 


                                    Allergies





No Known Allergies Allergy (Verified 03/29/20 20:14)


   








Home Medications: 


                                Home Medications











 Medication  Instructions  Recorded  Confirmed  Last Taken  Type


 


Fluconazole (Nf) [Diflucan TAB] 150 mg PO ONCE #2 tablet 07/10/19 02/16/21 

Unknown Rx


 


metroNIDAZOLE [Flagyl TAB] 500 mg PO Q12HR #14 tab 12/13/19 02/16/21 Unknown Rx


 


Cetirizine HCl [ZyrTEC] 10 mg PO QAM 14 Days #14 capsule 03/29/20 02/16/21 

Unknown Rx


 


Fluticasone [Flonase] 1 spray NS QDAY 14 Days #1 bottle 03/29/20 02/16/21 

Unknown Rx











Active Medications: 














Generic Name Dose Route Start Last Admin





  Trade Name Freq  PRN Reason Stop Dose Admin


 


Acetaminophen  650 mg  02/12/21 01:07  02/18/21 18:17





  Acetaminophen 325 Mg Tab  PO   650 mg





  Q4H PRN   Administration





  Pain MILD(1-3)/Fever >100.5/HA  


 


Amlodipine Besylate  10 mg  02/12/21 12:00  02/18/21 13:13





  Amlodipine 10 Mg Tab  PO   Not Given





  QDAY KETTY  


 


Atorvastatin Calcium  20 mg  02/12/21 22:00  02/18/21 22:06





  Atorvastatin 20 Mg Tab  PO   20 mg





  QHS KETTY   Administration


 


Benzonatate  100 mg  02/18/21 22:00  02/18/21 22:06





  Benzonatate 100 Mg Cap  PO   100 mg





  BID KETTY   Administration


 


Calcium Carbonate/Glycine  1,250 mg  02/12/21 12:00  02/18/21 22:06





  Calcium Carbonate 1250 Mg Tab  PO   1,250 mg





  BID KETTY   Administration


 


Famotidine  10 mg  02/12/21 10:00  02/18/21 22:06





  Famotidine 10 Mg Tab  PO   10 mg





  BID KETTY   Administration


 


Fluticasone Propionate  50 mcg  02/12/21 10:00  02/18/21 13:12





  Fluticasone Propionate Nasal Spray 16 Gm  NS   50 mcg





  QDAY KETTY   Administration


 


Guaifenesin  200 mg  02/18/21 17:51  02/18/21 18:40





  Guaifenesin 100 Mg/5 Ml Oral Liqd  PO   200 mg





  Q4H PRN   Administration





  Cough  


 


Heparin Sodium (Porcine)  5,000 unit  02/12/21 06:00  02/19/21 05:50





  Heparin 5,000 Unit/1 Ml Vial  SUB-Q   Not Given





  Q8HR [facility]  


 


Hydralazine HCl  10 mg  02/12/21 05:12  02/17/21 11:19





  Hydralazine 20 Mg/1 Ml Inj  IV   10 mg





  Q6HR PRN   Administration





  Blood Pressure  


 


Hydroxychloroquine Sulfate  200 mg  02/13/21 10:00  02/18/21 13:13





  Hydroxychloroquine 200 Mg Tab  PO   Not Given





  QDAY [facility]  


 


Sodium Chloride  500 mls @ 50 mls/hr  02/16/21 11:00 





  Nacl 0.9% 500 Ml  IV  





  AS DIRECT KETTY  


 


Morphine Sulfate  2 mg  02/12/21 01:07  02/15/21 00:49





  Morphine 2 Mg/1 Ml Inj  IV   2 mg





  Q4H PRN   Administration





  Pain, Moderate (4-6)  


 


Ondansetron HCl  4 mg  02/12/21 01:07  02/12/21 15:29





  Ondansetron 4 Mg/2 Ml Inj  IV   4 mg





  Q8H PRN   Administration





  Nausea And Vomiting  


 


Sodium Chloride  10 ml  02/12/21 10:00  02/18/21 22:07





  Sodium Chloride 0.9% 10 Ml Flush Syringe  IV   10 ml





  BID KETTY   Administration


 


Sodium Chloride  10 ml  02/12/21 01:07  02/13/21 04:30





  Sodium Chloride 0.9% 10 Ml Flush Syringe  IV   10 ml





  PRN PRN   Administration





  LINE FLUSH

## 2021-07-16 ENCOUNTER — HOSPITAL ENCOUNTER (OUTPATIENT)
Dept: HOSPITAL 5 - OR | Age: 21
Discharge: HOME | End: 2021-07-16
Attending: SURGERY
Payer: COMMERCIAL

## 2021-07-16 VITALS — SYSTOLIC BLOOD PRESSURE: 132 MMHG | DIASTOLIC BLOOD PRESSURE: 98 MMHG

## 2021-07-16 DIAGNOSIS — I12.0: Primary | ICD-10-CM

## 2021-07-16 DIAGNOSIS — Z79.899: ICD-10-CM

## 2021-07-16 DIAGNOSIS — Z86.711: ICD-10-CM

## 2021-07-16 DIAGNOSIS — Z87.891: ICD-10-CM

## 2021-07-16 DIAGNOSIS — Z98.890: ICD-10-CM

## 2021-07-16 DIAGNOSIS — E78.00: ICD-10-CM

## 2021-07-16 DIAGNOSIS — N18.6: ICD-10-CM

## 2021-07-16 DIAGNOSIS — Z87.440: ICD-10-CM

## 2021-07-16 LAB
BUN SERPL-MCNC: 48 MG/DL (ref 7–17)
BUN/CREAT SERPL: 6 %
CALCIUM SERPL-MCNC: 8.6 MG/DL (ref 8.4–10.2)
HCT VFR BLD CALC: 32.3 % (ref 30.3–42.9)
HEMOLYSIS INDEX: 6
HGB BLD-MCNC: 10.5 GM/DL (ref 10.1–14.3)
MCHC RBC AUTO-ENTMCNC: 33 % (ref 30–34)
MCV RBC AUTO: 77 FL (ref 79–97)
PLATELET # BLD: 151 K/MM3 (ref 140–440)
RBC # BLD AUTO: 4.21 M/MM3 (ref 3.65–5.03)

## 2021-07-16 PROCEDURE — 36830 ARTERY-VEIN NONAUTOGRAFT: CPT

## 2021-07-16 PROCEDURE — C1768 GRAFT, VASCULAR: HCPCS

## 2021-07-16 PROCEDURE — 64415 NJX AA&/STRD BRCH PLXS IMG: CPT

## 2021-07-16 PROCEDURE — 81025 URINE PREGNANCY TEST: CPT

## 2021-07-16 PROCEDURE — 80048 BASIC METABOLIC PNL TOTAL CA: CPT

## 2021-07-16 PROCEDURE — 36415 COLL VENOUS BLD VENIPUNCTURE: CPT

## 2021-07-16 PROCEDURE — 85027 COMPLETE CBC AUTOMATED: CPT

## 2021-07-16 PROCEDURE — 64450 NJX AA&/STRD OTHER PN/BRANCH: CPT

## 2021-07-16 NOTE — OPERATIVE REPORT
Operative Report


Operative Report: 





Date of procedure: 7/16/2021





Pre-operative diagnosis: End-Stage Renal Disease





Post-operative diagnosis: Same





Procedure(s):


1.  Creation of Right brachial Artery to Axillary Vein AV Graft with 5 mm Bovine

Graft Artergraft





Surgeon: Broderick Kidd MD





Assistant: None





Anesthesia: Regional/MAC





EBL: Minimal





Counts: Correct





Complications: None





Condition: Stable





Findings: Successful Creation of Right arm AV Graft with Palpable Thrill and 

Palpable Radial Pulse at the Completion of the Case.





Specimen: None





Indication:





The patient is a 21-year-old female with a history of end-stage renal disease 

who is currently on hemodialysis through a permacath.  She is in need of long-

term dialysis access and did not have adequate vein for creation of a fistula so

she will require creation of an AV graft.  She was given the risk, benefits, and

alternative procedures and consented to the procedure.





Description of Procedure:  





Prior to being transported to the operating room the patient had a regional 

block of the right arm performed.  After the block was performed the patient was

transported to the operating room and adequately sedated.  The patient's right 

arm was then prepped and draped in normal sterile fashion.  A longitudinal 

incision was made on the medial aspect of the arm just proximal to the 

antecubital crease and carried down to the brachial artery using sharp 

dissection.  The brachial artery was dissected out circumferentially both 

proximally and distally and controlled with vessel loops.  A second incision was

created in longitudinal fashion on the medial aspect of the arm just distal to 

the axillary crease and carried down to the axillary vein using sharp 

dissection.  Axillary vein was dissected out circumferentially and controlled 

with a vessel loop.  I then used a Milena-Wick tunneler to tunnel from the 

brachial artery incision to the axillary vein incision and then put an 5 mm 

bovine through the tunnel.  I infused with heparinized saline to ensure that it 

was not twisted or kinked.  I put the brachial artery vessel loops on tension 

controlling the flow and then created an arteriotomy using an 11 blade and Bueno

scissors.  I beveled the graft and created an end-to-side anastomosis using 6-0 

Prolene running fashion.  I clamped the graft just proximal to the anastomosis 

and then released the vessel loops restoring flow in the brachial artery.  I 

placed quick clot in incision to achieve hemostasis.  I cut the proximal end of 

the graft to the appropriate length and beveled the graft in preparation for a 

venous anastomosis.  I controlled the axillary vein a Satinsky clamp and created

a venotomy using an 11 blade and Bueno scissors.  I created an end to side anast

omosis using a 6-0 Prolene in running fashion.  Prior to completing the 

anastomosis I flushed the graft to ensure there was no thrombus and then 

completed the anastamosis.  I released all clamps allowing flow into the AV 

graft which had an excellent thrill.  I packed the wound with quick clot to 

achieve hemostasis.  I anesthetized both wounds with 0.5% Marcaine and then 

closed both wounds in 2 layers using 3-0 Vicryl in running fashion in the deep 

dermal layer and 4-0 Monocryl in running fashion the subcuticular layer.  I 

dressed both wounds with Dermabond.  The patient tolerated the procedure well 

all sponge, needle, and instrument counts were correct.  The patient was taken 

to recovery in stable condition.

## 2021-07-16 NOTE — SHORT STAY SUMMARY
Short Stay Documentation


Date of service: 07/16/21


Narrative H&P: 





See H&P





- History


H&P: obtained from office





- Allergies and Medications


Current Medications: 


                                    Allergies





No Known Allergies Allergy (Verified 06/30/21 15:52)


   





                                Home Medications











 Medication  Instructions  Recorded  Confirmed  Last Taken  Type


 


Calcium Carbonate [Oscal 1250MG 1,250 mg PO BID #60 tablet 02/19/21 07/16/21 

07/15/21 Rx





TAB]     


 


Hydroxychloroquine [Plaquenil] 200 mg PO QDAY #30 tablet 02/19/21 07/16/21 07/16/21 06:00 Rx


 


amLODIPine 10 mg PO QDAY #30 tablet 02/19/21 07/16/21 07/16/21 06:00 Rx


 


Fluticasone [Flonase] 1 spray NS PRN PRN 06/30/21 07/16/21 07/15/21 History








Active Medications





Cefazolin Sodium (Cefazolin/Sterile Water 2 Gm/20 Ml Syringe)  2 gm IV PREOP NR


   Stop: 07/16/21 23:00


Fentanyl (Fentanyl 100 Mcg/2 Ml Inj)  100 mcg IV ONCE NR


   Stop: 07/16/21 13:00


Hydromorphone HCl (Hydromorphone 1 Mg/1 Ml Inj)  0.25 mg IV Q10MIN PRN


   PRN Reason: Pain, Moderate (4-6)


   Stop: 07/16/21 23:00


Hydromorphone HCl (Hydromorphone 1 Mg/1 Ml Inj)  0.5 mg IV Q10MIN PRN


   PRN Reason: Pain , Severe (7-10)


   Stop: 07/16/21 23:00


Sodium Chloride (Nacl 0.9% 1000 Ml)  1,000 mls @ 42 mls/hr IV AS DIRECT KETTY


Midazolam HCl (Midazolam 2 Mg/2 Ml Inj)  2 mg IV PREOP NR


   Stop: 07/16/21 23:59


Ondansetron HCl (Ondansetron 4 Mg/2 Ml Inj)  4 mg IV ONCE PRN


   PRN Reason: Nausea And Vomiting


   Stop: 07/16/21 16:00











- Brief post op/procedure progress note


Date of procedure: 07/16/21


Pre-op diagnosis: End-Stage Renal Disease


Post-op diagnosis: same


Procedure: 





Creation of Right Brachial Artery to Axillary Vein Arteriovenous Graft with 5 mm

 Bovine Artegraft


Anesthesia: MAC, regional


Surgeon: VIANCA CANTOR


Estimated blood loss: minimal


Pathology: none


Condition: stable





- Disposition


Condition at discharge: Good


Disposition: DC-01 TO HOME OR SELFCARE





Short Stay Discharge Plan


Activity: other (No heavy lifting with right arm for 2 weeks.)


Wound: open to air, keep clean and dry, other (Okay to wash the right arm wounds

 with soap and water but do not soak in water for 2 weeks.)


Follow up with: 


VIANCA CANTOR MD [Staff Physician] - 14 Days


Prescriptions: 


HYDROcodone/APAP 5-325 [[place] 5/325] 1 each PO Q4HR PRN #30 tablet


 PRN Reason: Pain

## 2021-07-16 NOTE — ANESTHESIA CONSULTATION
Anesthesia Consult and Med Hx


Date of service: 07/16/21





- Airway


Anesthetic Teeth Evaluation: Good


ROM Head & Neck: Adequate


Mental/Hyoid Distance: Adequate


Mallampati Class: Class III


Intubation Access Assessment: Probably Good





- Pre-Operative Health Status


ASA Pre-Surgery Classification: ASA3


Nerve Block: IS (GA if needed)





- Pulmonary


Hx Smoking: Yes (Former)





- Cardiovascular System


Hx Hypertension: Yes (Had negative ECHO 02/2021)


Hx Pacemaker: No


Hx Internal Defibrillator: No





- Central Nervous System


Hx Psychiatric Problems: No





- Gastrointestinal


Hx Gastroesophageal Reflux Disease: No





- Endocrine


Hx Renal Disease: Yes (Last HD yesterday. Because of lupus)


Hx End Stage Renal Disease: Yes





- Hematic


Hx Anemia: No (LUPUS)


Hx Sickle Cell Disease: No





- Other Systems


Hx Alcohol Use: No


Hx Substance Use: No


Hx Cancer: No


Hx Obesity: No

## 2021-12-18 ENCOUNTER — HOSPITAL ENCOUNTER (OUTPATIENT)
Dept: HOSPITAL 5 - ED | Age: 21
Setting detail: OBSERVATION
Discharge: LEFT BEFORE DISCHARGE | End: 2021-12-18
Attending: INTERNAL MEDICINE | Admitting: HOSPITALIST
Payer: MEDICARE

## 2021-12-18 DIAGNOSIS — E87.5: ICD-10-CM

## 2021-12-18 DIAGNOSIS — D64.9: ICD-10-CM

## 2021-12-18 DIAGNOSIS — M32.9: ICD-10-CM

## 2021-12-18 DIAGNOSIS — I12.0: Primary | ICD-10-CM

## 2021-12-18 DIAGNOSIS — Z98.890: ICD-10-CM

## 2021-12-18 DIAGNOSIS — Z99.2: ICD-10-CM

## 2021-12-18 DIAGNOSIS — N18.6: ICD-10-CM

## 2021-12-18 DIAGNOSIS — Z79.899: ICD-10-CM

## 2021-12-18 LAB
BASOPHILS # (AUTO): 0 K/MM3 (ref 0–0.1)
BASOPHILS NFR BLD AUTO: 0.2 % (ref 0–1.8)
BUN SERPL-MCNC: 92 MG/DL (ref 7–17)
BUN/CREAT SERPL: 6 %
CALCIUM SERPL-MCNC: 8.7 MG/DL (ref 8.4–10.2)
EOSINOPHIL # BLD AUTO: 0.1 K/MM3 (ref 0–0.4)
EOSINOPHIL NFR BLD AUTO: 1.1 % (ref 0–4.3)
HCT VFR BLD CALC: 29 % (ref 30.3–42.9)
HEMOLYSIS INDEX: 21
HGB BLD-MCNC: 8.9 GM/DL (ref 10.1–14.3)
LYMPHOCYTES # BLD AUTO: 1.3 K/MM3 (ref 1.2–5.4)
LYMPHOCYTES NFR BLD AUTO: 17.9 % (ref 13.4–35)
MCHC RBC AUTO-ENTMCNC: 31 % (ref 30–34)
MCV RBC AUTO: 75 FL (ref 79–97)
MONOCYTES # (AUTO): 0.6 K/MM3 (ref 0–0.8)
MONOCYTES % (AUTO): 7.7 % (ref 0–7.3)
PLATELET # BLD: 142 K/MM3 (ref 140–440)
RBC # BLD AUTO: 3.87 M/MM3 (ref 3.65–5.03)

## 2021-12-18 PROCEDURE — 85025 COMPLETE CBC W/AUTO DIFF WBC: CPT

## 2021-12-18 PROCEDURE — G0378 HOSPITAL OBSERVATION PER HR: HCPCS

## 2021-12-18 PROCEDURE — 36415 COLL VENOUS BLD VENIPUNCTURE: CPT

## 2021-12-18 PROCEDURE — 80048 BASIC METABOLIC PNL TOTAL CA: CPT

## 2021-12-18 PROCEDURE — 93005 ELECTROCARDIOGRAM TRACING: CPT

## 2021-12-18 PROCEDURE — 99284 EMERGENCY DEPT VISIT MOD MDM: CPT

## 2021-12-18 NOTE — EMERGENCY DEPARTMENT REPORT
ED General Adult HPI





- General


Chief complaint: High BP


Stated complaint: DIALYSIS


Source: patient


Mode of arrival: Ambulatory


Limitations: No Limitations





- History of Present Illness


Initial comments: 





Patient is a 21-year-old female history of lupus and end-stage renal disease on 

hemodialysis who presents to the emergency department with concern as she has 

missed dialysis on today.  Patient states she usually dialyzed Tuesday Thursday Saturday.  She states that she was told that she had a deadline and that she 

would no longer be able to get dialysis at her center, [facility] past

a certain date.  She missed her that line did not find a nephrologist and they 

were unable to dialyze her today.  She has no complaints she denies any chest 

pain shortness of breath or fluid overload.  She notes that she is on blood 

pressure medication including lisinopril and one other medication she cannot 

remember and states that her blood pressure usually runs high.  She notes she 

did take her blood pressure earlier today.  She notes that she has been on 

dialysis for the past 9 to 10 months and states that she has never seen a 

nephrologist and has only seen a vascular surgeon.


Severity scale (0 -10): 0





- Related Data


                                Home Medications











 Medication  Instructions  Recorded  Confirmed  Last Taken


 


Fluticasone [Flonase] 1 spray NS PRN PRN 06/30/21 07/16/21 07/15/21








                                  Previous Rx's











 Medication  Instructions  Recorded  Last Taken  Type


 


Calcium Carbonate [Oscal 1250MG 1,250 mg PO BID #60 tablet 02/19/21 07/15/21 Rx





TAB]    


 


Hydroxychloroquine [Plaquenil] 200 mg PO QDAY #30 tablet 02/19/21 07/16/21 06:00

 Rx


 


amLODIPine 10 mg PO QDAY #30 tablet 02/19/21 07/16/21 06:00 Rx


 


HYDROcodone/APAP 5-325 [[place] 1 each PO Q4HR PRN #30 tablet 07/16/21 Unknown Rx





5/325]    











                                    Allergies











Allergy/AdvReac Type Severity Reaction Status Date / Time


 


No Known Allergies Allergy   Verified 06/30/21 15:52














ED Review of Systems


ROS: 


Stated complaint: DIALYSIS


Other details as noted in HPI





Constitutional: denies: chills, fever


Eyes: denies: eye pain, eye discharge, vision change


ENT: denies: ear pain, throat pain


Respiratory: denies: cough, shortness of breath, wheezing


Cardiovascular: denies: chest pain, palpitations


Endocrine: no symptoms reported


Gastrointestinal: denies: abdominal pain, nausea, diarrhea


Genitourinary: denies: urgency, dysuria, discharge


Musculoskeletal: denies: back pain, joint swelling, arthralgia


Skin: denies: rash, lesions


Neurological: denies: headache, weakness, paresthesias


Psychiatric: denies: anxiety, depression


Hematological/Lymphatic: denies: easy bleeding, easy bruising





ED Past Medical Hx





- Past Medical History


Previous Medical History?: Yes


Hx Hypertension: Yes (Had negative ECHO 02/2021)


Hx Deep Vein Thrombosis:  (unknown)


Hx Renal Disease: Yes (Last HD yesterday. Because of lupus)


Hx Sickle Cell Disease: No


Additional medical history: Lupus





- Surgical History


Past Surgical History?: Yes


Hx Pacemaker: No


Hx Internal Defibrillator: No


Additional Surgical History: FISTULA TO RUE





- Social History


Smoking Status: Former Smoker





- Medications


Home Medications: 


                                Home Medications











 Medication  Instructions  Recorded  Confirmed  Last Taken  Type


 


Calcium Carbonate [Oscal 1250MG 1,250 mg PO BID #60 tablet 02/19/21 07/16/21 

07/15/21 Rx





TAB]     


 


Hydroxychloroquine [Plaquenil] 200 mg PO QDAY #30 tablet 02/19/21 07/16/21 07/16/21 06:00 Rx


 


amLODIPine 10 mg PO QDAY #30 tablet 02/19/21 07/16/21 07/16/21 06:00 Rx


 


Fluticasone [Flonase] 1 spray NS PRN PRN 06/30/21 07/16/21 07/15/21 History


 


HYDROcodone/APAP 5-325 [[place] 1 each PO Q4HR PRN #30 tablet 07/16/21  Unknown Rx





5/325]     














ED Physical Exam





- General


Limitations: No Limitations


General appearance: alert, in no apparent distress





- Head


Head exam: Present: atraumatic, normocephalic





- Eye


Eye exam: Present: normal appearance





- ENT


ENT exam: Present: mucous membranes moist





- Neck


Neck exam: Present: normal inspection





- Respiratory


Respiratory exam: Present: normal lung sounds bilaterally.  Absent: respiratory 

distress





- Cardiovascular


Cardiovascular Exam: Present: regular rate, normal rhythm.  Absent: systolic 

murmur, diastolic murmur, rubs, gallop





- GI/Abdominal


GI/Abdominal exam: Present: soft, normal bowel sounds





- Rectal


Rectal exam: Present: deferred





- Extremities Exam


Extremities exam: Present: normal inspection





- Back Exam


Back exam: Present: normal inspection





- Neurological Exam


Neurological exam: Present: alert, oriented X3





- Psychiatric


Psychiatric exam: Present: normal affect, normal mood





- Skin


Skin exam: Present: warm, dry, intact, normal color.  Absent: rash





ED Course


                                   Vital Signs











  12/18/21 12/19/21 12/19/21





  17:19 11:43 11:53


 


Temperature 98.6 F  


 


Pulse Rate 84  80


 


Respiratory 18  





Rate   


 


Blood Pressure  163/107 


 


Blood Pressure 173/116  162/110





[RIGHT]   


 


O2 Sat by Pulse 100  





Oximetry   














- Reevaluation(s)


Reevaluation #1: 





12/18/21 19:48


Patient is noted to be hyperkalemic. I am still awaiting EKG that was ordered 

with initial orders. I have informed charge nurse that patient should be on in 

the room on a monitor. I have also consulted nephrology who will plan to dialyze

 the patient.


Reevaluation #2: 





12/18/21 20:28


Patient initially tried to leave AGAINST MEDICAL ADVICE however she was 

convinced to stay.  Will put in ED and decision to admit admit patient to be 

admitted for further management.








- Consultations


Consultation #1: 





12/18/21 19:40


Consulted Dr. Quevedo for patient's hyperkalemia





ED Medical Decision Making





- Lab Data


Result diagrams: 


                                 12/19/21 08:26





                                 12/19/21 08:26





- Medical Decision Making





Patient is a 21-year-old female presents emergency department complaints of 

missed dialysis.  Patient states she does not currently have a nephrologist and 

has been turned away from her dialysis clinic.  Plan to evaluate for 

hyperkalemia or uremia.  Patient is noted to be hypertensive but has history of 

high blood pressure.  Will also obtain EKG will likely discuss with nephrology 

either way as patient likely will need nephrology follow-up and a new dialysis 

center even if her labs are normal.


Critical care attestation.: 


If time is entered above; I have spent that time in minutes in the direct care 

of this critically ill patient, excluding procedure time.








ED Disposition


Clinical Impression: 


 Hyperkalemia, Uremia





Disposition: 07 LEFT AGAINST MEDICAL ADVICE


Is pt being admited?: Yes


Does the pt Need Aspirin: No


Condition: Stable


Time of Disposition: 19:51

## 2021-12-18 NOTE — HISTORY AND PHYSICAL REPORT
History of Present Illness


Date of examination: 12/18/21


Date of admission: 


12/18/21


Chief complaint: 





High blood pressure


Missed dialysis


History of present illness: 





 21-year-old female history of lupus and end-stage renal disease on hemodialysis

who presents to the emergency department with concern as she has missed dialysis

on today.  Patient states she usually dialyzed Tuesday Thursday Saturday.  She 

states that she was told that she had a deadline and that she would no longer be

able to get dialysis at her center, [facility] past a certain date.  

She missed her that line did not find a nephrologist and they were unable to 

dialyze her today.  She has no complaints she denies any chest pain shortness of

breath or fluid overload.  She notes that she is on blood pressure medication 

including lisinopril and one other medication she cannot remember and states 

that her blood pressure usually runs high.  She notes she did take her blood 

pressure earlier today.  She notes that she has been on dialysis for the past 9 

to 10 months and states that she has never seen a nephrologist and has only seen

a vascular surgeon.








In the emergency room patient is found to have BUN of 92 and creatinine of 15.7 

and potassium of 5.5 so going to admit the patient Case discussed with 

nephrology for dialysis in the morning











Past History


Past Medical History: ESRD, hypertension, renal failure





Medications and Allergies


                                    Allergies











Allergy/AdvReac Type Severity Reaction Status Date / Time


 


No Known Allergies Allergy   Verified 06/30/21 15:52











                                Home Medications











 Medication  Instructions  Recorded  Confirmed  Last Taken  Type


 


Calcium Carbonate [Oscal 1250MG 1,250 mg PO BID #60 tablet 02/19/21 07/16/21 

07/15/21 Rx





TAB]     


 


Hydroxychloroquine [Plaquenil] 200 mg PO QDAY #30 tablet 02/19/21 07/16/21 07/16/21 06:00 Rx


 


amLODIPine 10 mg PO QDAY #30 tablet 02/19/21 07/16/21 07/16/21 06:00 Rx


 


Fluticasone [Flonase] 1 spray NS PRN PRN 06/30/21 07/16/21 07/15/21 History


 


HYDROcodone/APAP 5-325 [[place] 1 each PO Q4HR PRN #30 tablet 07/16/21  Unknown Rx





5/325]     














Review of Systems


All systems: negative


Constitutional: other (Missed dialysis)





Exam





- Constitutional


Vitals: 


                                        











Temp Pulse Resp BP Pulse Ox


 


 98.6 F   84   18   173/116   100 


 


 12/18/21 17:19  12/18/21 17:19  12/18/21 17:19  12/18/21 17:19  12/18/21 17:19











General appearance: Present: no acute distress, well-nourished





- EENT


Eyes: Present: PERRL


ENT: hearing intact, clear oral mucosa





- Neck


Neck: Present: supple, normal ROM





- Respiratory


Respiratory effort: normal


Respiratory: bilateral: diminished





- Cardiovascular


Heart Sounds: Present: S1 & S2.  Absent: rub, click





- Extremities


Extremities: pulses symmetrical, No edema


Peripheral Pulses: within normal limits





- Abdominal


General gastrointestinal: Present: soft, non-tender, non-distended, normal bowel

sounds


Female genitourinary: Present: normal





- Integumentary


Integumentary: Present: clear, warm, dry





- Musculoskeletal


Musculoskeletal: gait normal, strength equal bilaterally





- Psychiatric


Psychiatric: appropriate mood/affect, intact judgment & insight





- Neurologic


Neurologic: CNII-XII intact, moves all extremities





Results





- Labs


CBC & Chem 7: 


                                 12/18/21 17:51





                                 12/18/21 17:51


Labs: 


                             Laboratory Last Values











WBC  7.5 K/mm3 (4.5-11.0)   12/18/21  17:51    


 


RBC  3.87 M/mm3 (3.65-5.03)   12/18/21  17:51    


 


Hgb  8.9 gm/dl (10.1-14.3)  L  12/18/21  17:51    


 


Hct  29.0 % (30.3-42.9)  L  12/18/21  17:51    


 


MCV  75 fl (79-97)  L  12/18/21  17:51    


 


MCH  23 pg (28-32)  L  12/18/21  17:51    


 


MCHC  31 % (30-34)   12/18/21  17:51    


 


RDW  20.8 % (13.2-15.2)  H  12/18/21  17:51    


 


Plt Count  142 K/mm3 (140-440)   12/18/21  17:51    


 


Lymph % (Auto)  17.9 % (13.4-35.0)   12/18/21  17:51    


 


Mono % (Auto)  7.7 % (0.0-7.3)  H  12/18/21  17:51    


 


Eos % (Auto)  1.1 % (0.0-4.3)   12/18/21  17:51    


 


Baso % (Auto)  0.2 % (0.0-1.8)   12/18/21  17:51    


 


Lymph # (Auto)  1.3 K/mm3 (1.2-5.4)   12/18/21  17:51    


 


Mono # (Auto)  0.6 K/mm3 (0.0-0.8)   12/18/21  17:51    


 


Eos # (Auto)  0.1 K/mm3 (0.0-0.4)   12/18/21  17:51    


 


Baso # (Auto)  0.0 K/mm3 (0.0-0.1)   12/18/21  17:51    


 


Seg Neutrophils %  73.1 % (40.0-70.0)  H  12/18/21  17:51    


 


Seg Neutrophils #  5.5 K/mm3 (1.8-7.7)   12/18/21  17:51    


 


Sodium  138 mmol/L (137-145)   12/18/21  17:51    


 


Potassium  5.5 mmol/L (3.6-5.0)  H  12/18/21  17:51    


 


Chloride  101.3 mmol/L ()   12/18/21  17:51    


 


Carbon Dioxide  16 mmol/L (22-30)  L  12/18/21  17:51    


 


Anion Gap  26 mmol/L  12/18/21  17:51    


 


BUN  92 mg/dL (7-17)  H  12/18/21  17:51    


 


Creatinine  15.7 mg/dL (0.6-1.2)  H  12/18/21  17:51    


 


Estimated GFR  3 ml/min  12/18/21  17:51    


 


BUN/Creatinine Ratio  6 %  12/18/21  17:51    


 


Glucose  108 mg/dL ()  H  12/18/21  17:51    


 


Calcium  8.7 mg/dL (8.4-10.2)   12/18/21  17:51    














Assessment and Plan


VTE prophylaxis?: Chemical


Plan of care discussed with patient/family: Yes





- Patient Problems


(1) End stage renal disease on dialysis


Current Visit: Yes   Status: Acute   


Plan to address problem: 


Admit to MedSurg.  Avoid nephrotoxic drug.  Renally dose medication.  Case 

discussed with nephrologist Dr. Quevedo for dialysis in the morning.  Recheck BMP

in the morning








(2) Hypertension


Current Visit: Yes   Status: Acute   


Plan to address problem: 


Amlodipine 10 mg p.o. daily.  Hydralazine 10 mg IV every 6 hours as needed.  We 

continue the home medication








(3) Hyperkalemia


Current Visit: Yes   Status: Acute   


Plan to address problem: 


Kayexalate 30 g p.o. x1 dose.  Calcium gluconate 1 g IV x1 dose.  Case discussed

with nephrology for dialysis.  Recheck BMP in the morning








(4) DVT prophylaxis


Current Visit: Yes   Status: Acute   


Plan to address problem: 


Paren 5000 units subcu every 8 hours for DVT prophylaxis.  Pepcid 20 mg p.o. 

twice daily for GI prophylaxis.  Patient is a full code

## 2021-12-19 VITALS — SYSTOLIC BLOOD PRESSURE: 162 MMHG | DIASTOLIC BLOOD PRESSURE: 110 MMHG

## 2021-12-19 LAB
BASOPHILS # (AUTO): 0 K/MM3 (ref 0–0.1)
BASOPHILS NFR BLD AUTO: 0.3 % (ref 0–1.8)
BUN SERPL-MCNC: 98 MG/DL (ref 7–17)
BUN/CREAT SERPL: 6 %
CALCIUM SERPL-MCNC: 8.9 MG/DL (ref 8.4–10.2)
EOSINOPHIL # BLD AUTO: 0.1 K/MM3 (ref 0–0.4)
EOSINOPHIL NFR BLD AUTO: 1.5 % (ref 0–4.3)
HCT VFR BLD CALC: 30.6 % (ref 30.3–42.9)
HEMOLYSIS INDEX: 7
HGB BLD-MCNC: 9.5 GM/DL (ref 10.1–14.3)
LYMPHOCYTES # BLD AUTO: 1.2 K/MM3 (ref 1.2–5.4)
LYMPHOCYTES NFR BLD AUTO: 16.7 % (ref 13.4–35)
MCHC RBC AUTO-ENTMCNC: 31 % (ref 30–34)
MCV RBC AUTO: 75 FL (ref 79–97)
MONOCYTES # (AUTO): 0.5 K/MM3 (ref 0–0.8)
MONOCYTES % (AUTO): 7.1 % (ref 0–7.3)
PLATELET # BLD: 149 K/MM3 (ref 140–440)
RBC # BLD AUTO: 4.07 M/MM3 (ref 3.65–5.03)

## 2021-12-19 NOTE — PROGRESS NOTE
Assessment and Plan


Assessment and plan: 


-- End stage renal disease on dialysis TTS


Current Visit: Yes   Status: Acute   


Nephrology evaluated the patient , hemodialysis per schedule ,avoid nephrotoxic 

drug.  


Renally dose medication.  Nephrologist Dr. Quevedo following


HD per schedule





--Noncompliance; with diet.  Dialysis and follow-up visit


Current Visit: Yes   Status: Chronic


Patient strongly counseled to adhere to the treatment plan


Verbalized understanding-





--Hyperkalemia


Current Visit: Yes   Status: Acute   


Kayexalate 30 g p.o. x1 dose.  Calcium gluconate 1 g IV x1 dose. 


Nephrology consulted





-- Hypertension


Current Visit: Yes   Status: Acute   


Amlodipine 10 mg p.o. daily.  Hydralazine 10 mg IV every 6 hours as needed.  


We continue the home medication





--History of lupus ;


Current Visit: Yes   Status: Chronic


Stable





--Chronic anemia; due to ESRD


Current Visit: Yes   Status: Chronic


Closely monitor H&H transfuse as needed








--Full CODE STATUS





-- DVT prophylaxis


Current Visit: Yes   Status: Acute   


Paren 5000 units subcu every 8 hours for DVT prophylaxis. 





Pepcid 20 mg p.o. twice daily for GI prophylaxis.  





We will closely monitor the patient and adjust management as needed


Plan of care reviewed with the patient and her nurse














Hospitalist Physical





- Constitutional


Vitals: 


                                        











Temp Pulse Resp BP Pulse Ox


 


 98.6 F   84   18   173/116   100 


 


 12/18/21 17:19  12/18/21 17:19  12/18/21 17:19  12/18/21 17:19  12/18/21 17:19











General appearance: Present: no acute distress, well-nourished





Results





- Labs


CBC & Chem 7: 


                                 12/19/21 08:26





                                 12/19/21 08:26


Labs: 


                             Laboratory Last Values











WBC  7.4 K/mm3 (4.5-11.0)   12/19/21  08:26    


 


RBC  4.07 M/mm3 (3.65-5.03)   12/19/21  08:26    


 


Hgb  9.5 gm/dl (10.1-14.3)  L  12/19/21  08:26    


 


Hct  30.6 % (30.3-42.9)   12/19/21  08:26    


 


MCV  75 fl (79-97)  L  12/19/21  08:26    


 


MCH  23 pg (28-32)  L  12/19/21  08:26    


 


MCHC  31 % (30-34)   12/19/21  08:26    


 


RDW  21.0 % (13.2-15.2)  H  12/19/21  08:26    


 


Plt Count  149 K/mm3 (140-440)   12/19/21  08:26    


 


Lymph % (Auto)  16.7 % (13.4-35.0)   12/19/21  08:26    


 


Mono % (Auto)  7.1 % (0.0-7.3)   12/19/21  08:26    


 


Eos % (Auto)  1.5 % (0.0-4.3)   12/19/21  08:26    


 


Baso % (Auto)  0.3 % (0.0-1.8)   12/19/21  08:26    


 


Lymph # (Auto)  1.2 K/mm3 (1.2-5.4)   12/19/21  08:26    


 


Mono # (Auto)  0.5 K/mm3 (0.0-0.8)   12/19/21  08:26    


 


Eos # (Auto)  0.1 K/mm3 (0.0-0.4)   12/19/21  08:26    


 


Baso # (Auto)  0.0 K/mm3 (0.0-0.1)   12/19/21  08:26    


 


Seg Neutrophils %  74.4 % (40.0-70.0)  H  12/19/21  08:26    


 


Seg Neutrophils #  5.5 K/mm3 (1.8-7.7)   12/19/21  08:26    


 


Sodium  135 mmol/L (137-145)  L  12/19/21  08:26    


 


Potassium  5.6 mmol/L (3.6-5.0)  H  12/19/21  08:26    


 


Chloride  101.2 mmol/L ()   12/19/21  08:26    


 


Carbon Dioxide  16 mmol/L (22-30)  L  12/19/21  08:26    


 


Anion Gap  23 mmol/L  12/19/21  08:26    


 


BUN  98 mg/dL (7-17)  H  12/19/21  08:26    


 


Creatinine  16.1 mg/dL (0.6-1.2)  H  12/19/21  08:26    


 


Estimated GFR  3 ml/min  12/19/21  08:26    


 


BUN/Creatinine Ratio  6 %  12/19/21  08:26    


 


Glucose  106 mg/dL ()  H  12/19/21  08:26    


 


Calcium  8.9 mg/dL (8.4-10.2)   12/19/21  08:26    














Active Medications





- Current Medications


Current Medications: 














Generic Name Dose Route Start Last Admin





  Trade Name Freq  PRN Reason Stop Dose Admin


 


Acetaminophen  650 mg  12/18/21 20:31 





  Acetaminophen 325 Mg Tab  PO  





  Q4H PRN  





  Pain MILD(1-3)/Fever >100.5/HA  


 


Albuterol  2.5 mg  12/18/21 20:31 





  Albuterol 2.5 Mg/3 Ml Nebu  IH  





  Q3HRT PRN  





  Shortness Of Breath  


 


Albuterol/Ipratropium  1 ampul  12/19/21 02:00 





  Ipratropium/Albuterol Sulfate 3 Ml Ampul.Neb  IH  





  Q6HRT [facility]  


 


Amlodipine Besylate  10 mg  12/19/21 10:00 





  Amlodipine 10 Mg Tab  PO  





  QDAY [facility]  


 


Calcium Carbonate/Glycine  1,250 mg  12/18/21 22:00 





  Calcium Carbonate 1250 Mg Tab  PO  





  BID [facility]  


 


Famotidine  10 mg  12/18/21 22:00 





  Famotidine 10 Mg Tab  PO  





  BID [facility]  


 


Fluticasone Propionate  50 mcg  12/18/21 20:33 





  Fluticasone Propionate Nasal Spray 16 Gm  NS  





  QDAY PRN  





  Allergy Symptoms  


 


Heparin Sodium (Porcine)  5,000 unit  12/18/21 22:00 





  Heparin 5,000 Unit/1 Ml Vial  SUB-Q  





  Q8HR [facility]  


 


Hydromorphone HCl  0.5 mg  12/18/21 20:31 





  Hydromorphone 1 Mg/1 Ml Inj  IV  





  Q3H PRN  





  Pain , Severe (7-10)  


 


Hydroxychloroquine Sulfate  200 mg  12/19/21 10:00 





  Hydroxychloroquine 200 Mg Tab  PO  





  QDAY [facility]  


 


Morphine Sulfate  2 mg  12/18/21 20:31 





  Morphine 2 Mg/1 Ml Inj  IV  





  Q4H PRN  





  Pain, Moderate (4-6)  


 


Ondansetron HCl  4 mg  12/18/21 20:31 





  Ondansetron 4 Mg/2 Ml Inj  IV  





  Q8H PRN  





  Nausea And Vomiting  


 


Sodium Chloride  10 ml  12/18/21 22:00 





  Sodium Chloride 0.9% 10 Ml Flush Syringe  IV  





  BID [facility]  


 


Sodium Chloride  10 ml  12/18/21 20:31 





  Sodium Chloride 0.9% 10 Ml Flush Syringe  IV  





  PRN PRN  





  LINE FLUSH

## 2021-12-19 NOTE — CONSULTATION
History of Present Illness





- Reason for Consult


Consult date: 12/19/21


end stage renal disease





- History of Present Illness





This is a 21 year old  female who presents to the E.R yesterday 

with a chief complaint of needing dialysis. Patient is on T,T,S schedule and 

missed her dialysis treatment on Saturday. Patient has been non-compliant with 

her dialysis schedule. She goes to [facility]. Dr. Quevedo and 

this NP have had several discussions with patient about being compliant with her

hemodialysis treatments and have explained to her repeatedly the risks of 

missing treatments to include volume overload, uremia, electrolyte abnormalities

and death. Patient has history of ESRD, Lupus and hypertension. We are being 

consulted for management of this patient's ESRD.











Past History


Past Medical History: ESRD, hypertension, renal failure


Past Surgical History: Other (Perm catheter placement and removal, AVF 

placement)


Social history: no significant social history


Family history: no significant family history





Medications and Allergies


                                    Allergies











Allergy/AdvReac Type Severity Reaction Status Date / Time


 


No Known Allergies Allergy   Verified 06/30/21 15:52











                                Home Medications











 Medication  Instructions  Recorded  Confirmed  Last Taken  Type


 


Calcium Carbonate [Oscal 1250MG 1,250 mg PO BID #60 tablet 02/19/21 07/16/21 

07/15/21 Rx





TAB]     


 


Hydroxychloroquine [Plaquenil] 200 mg PO QDAY #30 tablet 02/19/21 07/16/21 07/16/21 06:00 Rx


 


amLODIPine 10 mg PO QDAY #30 tablet 02/19/21 07/16/21 07/16/21 06:00 Rx


 


Fluticasone [Flonase] 1 spray NS PRN PRN 06/30/21 07/16/21 07/15/21 History


 


HYDROcodone/APAP 5-325 [[place] 1 each PO Q4HR PRN #30 tablet 07/16/21  Unknown Rx





5/325]     











Active Meds: 


Active Medications





Acetaminophen (Acetaminophen 325 Mg Tab)  650 mg PO Q4H PRN


   PRN Reason: Pain MILD(1-3)/Fever >100.5/HA


Albuterol (Albuterol 2.5 Mg/3 Ml Nebu)  2.5 mg IH Q3HRT PRN


   PRN Reason: Shortness Of Breath


Albuterol/Ipratropium (Ipratropium/Albuterol Sulfate 3 Ml Ampul.Neb)  1 ampul IH

Q6HRT KETTY


Amlodipine Besylate (Amlodipine 10 Mg Tab)  10 mg PO QDAY [facility]


Calcium Carbonate/Glycine (Calcium Carbonate 1250 Mg Tab)  1,250 mg PO BID [facility]


Famotidine (Famotidine 10 Mg Tab)  10 mg PO BID [facility]


Fluticasone Propionate (Fluticasone Propionate Nasal Spray 16 Gm)  50 mcg NS 

QDAY PRN


   PRN Reason: Allergy Symptoms


Heparin Sodium (Porcine) (Heparin 5,000 Unit/1 Ml Vial)  5,000 unit SUB-Q Q8HR 

[facility]


Hydromorphone HCl (Hydromorphone 1 Mg/1 Ml Inj)  0.5 mg IV Q3H PRN


   PRN Reason: Pain , Severe (7-10)


Hydroxychloroquine Sulfate (Hydroxychloroquine 200 Mg Tab)  200 mg PO QDAY KETTY


Morphine Sulfate (Morphine 2 Mg/1 Ml Inj)  2 mg IV Q4H PRN


   PRN Reason: Pain, Moderate (4-6)


Ondansetron HCl (Ondansetron 4 Mg/2 Ml Inj)  4 mg IV Q8H PRN


   PRN Reason: Nausea And Vomiting


Sodium Chloride (Sodium Chloride 0.9% 10 Ml Flush Syringe)  10 ml IV BID [facility]


Sodium Chloride (Sodium Chloride 0.9% 10 Ml Flush Syringe)  10 ml IV PRN PRN


   PRN Reason: LINE FLUSH











Review of Systems


Constitutional: fatigue, no weight loss, no weight gain, no fever, no chills


Ears, nose, mouth and throat: no ear pain, no ear discharge, no tinnitis, no 

decreased hearing, no nose pain, no nasal congestion, no nasal discharge, no 

sinus pressure


Cardiovascular: no chest pain, no orthopnea, no palpitations, no rapid/irregular

heart beat, no edema, no syncope, no lightheadedness, no shortness of breath


Respiratory: no cough, no excessive sputum, no hemoptysis, no shortness of 

breath, no dyspnea on exertion


Gastrointestinal: no abdominal pain, no nausea, no vomiting, no diarrhea, no 

constipation, no change in bowel habits


Genitourinary Female: no pelvic pain, no flank pain, no menorrhagia, no dysuria,

no urinary frequency, no urgency, no stress incontinence


Rectal: no pain, no incontinence, no bleeding, no itching, no hemorrhoids


Musculoskeletal: no neck stiffness, no neck pain, no shooting arm pain, no arm 

numbness/tingling, no low back pain, no shooting leg pain


Integumentary: no rash, no pruritis, no redness, no sores, no wounds, no 

jaundice


Neurological: no head injury, no transient paralysis, no paralysis, no weakness,

no parathesias, no numbness, no tingling, no seizures, no syncope


Psychiatric: no anxiety, no memory loss, no change in sleep habits, no sleep di

sturbances, no insomnia, no hypersomnia, no change in appetite


Endocrine: no cold intolerance, no heat intolerance, no polyphagia, no excessive

thirst, no polydipsia, no polyuria





Exam





- Vital Signs


Vital signs: 


                                   Vital Signs











Temp Pulse Resp BP Pulse Ox


 


 98.6 F   84   18   173/116   100 


 


 12/18/21 17:19  12/18/21 17:19  12/18/21 17:19  12/18/21 17:19  12/18/21 17:19














- General Appearance


General appearance: well-developed, appears stated age


EENT: ATNC, PERRL, hearing intact, vision intact


Neck: Present: neck supple, trachea midline


Respiratory: Decreased Breath Sounds


Heart: S1S2


Gastrointestinal: Present: normoactive bowel sounds


Neurologic: alert and oriented x3


Musculoskeletal: Present: other (No edema)





Results





- Lab Results





                                 12/19/21 08:26





                                 12/19/21 08:26


                             Most recent lab results











Calcium  8.9 mg/dL (8.4-10.2)   12/19/21  08:26    














Assessment and Plan








Assessment:


End Stage Renal Disease, Non-compliant with hemodialysis


Hypertension


Hyperkalemia


Lupus


Anemia








Plan:


-Hemodialysis today for UF and clearance


-Patient consented to receiving HD today


-Spoke with on-call HD nurse Solange at 10:15 a.m this morning, she state she will

come in and dialyze patient today


-Hyperkalemia- Kionex 30 gram po x 1. HD today as ordered. 


-Fluid restriction of 1 liter per day


-Obtain daily weights


-Renally dose medications


-Assess dialysis needs daily


-Patient missed hemodialysis on Saturday and is non-complaint with outpatient HD

schedule. Dr. Quevedo and this NP have had several discussions with patient about

being compliant with her hemodialysis treatments and have explained to her 

repeatedly the risks of missing treatments to include volume overload, uremia, 

electrolyte abnormalities and death but patient continues to not be complaint 

with HD.


-Plan of care reviewed by Dr. Quevedo

## 2021-12-19 NOTE — ELECTROCARDIOGRAPH REPORT
[facility], [place]

                                       

Test Date:    2021               Test Time:    20:40:44

Pat Name:     NORTH CHACON          Department:   

Patient ID:   [id number]          Room:         [place] [id number]

Gender:       F                        Technician:   AS

:          2000               Requested By: PRITI NICOLAS

Order Number: [id number]              Reading MD:   Gentry Rojas

                                 Measurements

Intervals                              Axis          

Rate:         75                       P:            53

RI:           145                      QRS:          26

QRSD:         80                       T:            91

QT:           420                                    

QTc:          468                                    

                           Interpretive Statements

Sinus rhythm

Probable left ventricular hypertrophy

Nonspecific T abnrm, anterolateral leads

No previous ECG available for comparison

Electronically Signed On 2021 12:03:29 EST by Gentry Rojas